# Patient Record
Sex: FEMALE | Race: WHITE | NOT HISPANIC OR LATINO | Employment: FULL TIME | URBAN - METROPOLITAN AREA
[De-identification: names, ages, dates, MRNs, and addresses within clinical notes are randomized per-mention and may not be internally consistent; named-entity substitution may affect disease eponyms.]

---

## 2017-02-17 ENCOUNTER — TRANSFERRED RECORDS (OUTPATIENT)
Dept: HEALTH INFORMATION MANAGEMENT | Facility: CLINIC | Age: 27
End: 2017-02-17

## 2017-02-17 LAB — PAP-ABSTRACT: NORMAL

## 2017-08-07 ENCOUNTER — OFFICE VISIT (OUTPATIENT)
Dept: FAMILY MEDICINE | Facility: CLINIC | Age: 27
End: 2017-08-07
Payer: COMMERCIAL

## 2017-08-07 ENCOUNTER — RADIANT APPOINTMENT (OUTPATIENT)
Dept: GENERAL RADIOLOGY | Facility: CLINIC | Age: 27
End: 2017-08-07
Attending: FAMILY MEDICINE
Payer: COMMERCIAL

## 2017-08-07 VITALS
OXYGEN SATURATION: 99 % | WEIGHT: 130 LBS | HEART RATE: 80 BPM | RESPIRATION RATE: 16 BRPM | HEIGHT: 63 IN | TEMPERATURE: 98.9 F | DIASTOLIC BLOOD PRESSURE: 60 MMHG | SYSTOLIC BLOOD PRESSURE: 100 MMHG | BODY MASS INDEX: 23.04 KG/M2

## 2017-08-07 DIAGNOSIS — S99.911A ANKLE INJURY, RIGHT, INITIAL ENCOUNTER: ICD-10-CM

## 2017-08-07 DIAGNOSIS — S99.911A ANKLE INJURY, RIGHT, INITIAL ENCOUNTER: Primary | ICD-10-CM

## 2017-08-07 PROBLEM — G47.00 INSOMNIA: Status: ACTIVE | Noted: 2017-02-17

## 2017-08-07 PROCEDURE — 73610 X-RAY EXAM OF ANKLE: CPT | Mod: RT

## 2017-08-07 PROCEDURE — 99202 OFFICE O/P NEW SF 15 MIN: CPT | Performed by: FAMILY MEDICINE

## 2017-08-07 NOTE — MR AVS SNAPSHOT
"              After Visit Summary   2017    Melanie Dugan    MRN: [id number]           Patient Information     Date Of Birth          1990        Visit Information        Provider Department      2017 2:45 PM Wing Mccray MD [facility]        Today's Diagnoses     Ankle injury, right, initial encounter    -  1       Follow-ups after your visit        Follow-up notes from your care team     Return if symptoms worsen or fail to improve.      Who to contact     If you have questions or need follow up information about today's clinic visit or your schedule please contact [facility] directly at [phone].  Normal or non-critical lab and imaging results will be communicated to you by MyChart, letter or phone within 4 business days after the clinic has received the results. If you do not hear from us within 7 days, please contact the clinic through MyChart or phone. If you have a critical or abnormal lab result, we will notify you by phone as soon as possible.  Submit refill requests through [employer] or call your pharmacy and they will forward the refill request to us. Please allow 3 business days for your refill to be completed.          Additional Information About Your Visit        MyChart Information     [employer] lets you send messages to your doctor, view your test results, renew your prescriptions, schedule appointments and more. To sign up, go to [email or web address] . Click on \"Log in\" on the left side of the screen, which will take you to the Welcome page. Then click on \"Sign up Now\" on the right side of the page.     You will be asked to enter the access code listed below, as well as some personal information. Please follow the directions to create your username and password.     Your access code is: [email or web address]  Expires: 2017  3:38 PM     Your access code will  in 90 days. If you need help or a new code, please call your [place] clinic or " "[phone].        Care EveryWhere ID     This is your Care EveryWhere ID. This could be used by other organizations to access your [place] medical records  [id number]        Your Vitals Were     Pulse Temperature Respirations Height Pulse Oximetry BMI (Body Mass Index)    80 98.9  F (37.2  C) (Oral) 16 5' 3.25\" (1.607 m) 99% 22.85 kg/m2       Blood Pressure from Last 3 Encounters:   08/07/17 100/60    Weight from Last 3 Encounters:   08/07/17 130 lb (59 kg)               Primary Care Provider    None Specified       No primary provider on file.        Equal Access to Services     [facility]: Hadii usha Britton, wamarisolda hannah, mary kaalmada lindsey, paras martin . So [facility] [phone].    ATENCIÓN: Si habla español, tiene a potter disposición servicios gratuitos de asistencia lingüística. Llame al [phone].    We comply with applicable federal civil rights laws and [place] laws. We do not discriminate on the basis of race, color, national origin, age, disability sex, sexual orientation or gender identity.            Thank you!     Thank you for choosing [facility]  for your care. Our goal is always to provide you with excellent care. Hearing back from our patients is one way we can continue to improve our services. Please take a few minutes to complete the written survey that you may receive in the mail after your visit with us. Thank you!             Your Updated Medication List - Protect others around you: Learn how to safely use, store and throw away your medicines at www.disposemymeds.org.      Notice  As of 8/7/2017  3:38 PM    You have not been prescribed any medications.      "

## 2017-08-07 NOTE — PROGRESS NOTES
"  SUBJECTIVE:                                                    Melanie Dugan is a 27 year old female who presents to clinic today for the following health issues:      Musculoskeletal problem/pain      Duration: 1 1/2 weeks    Description  Location: right foot/ankle    Intensity:  moderate    Accompanying signs and symptoms: numbness, tingling and swelling    History  Previous similar problem: no   Previous evaluation:  none    Precipitating or alleviating factors:  Trauma or overuse: YES  Aggravating factors include: sitting and standing    Therapies tried and outcome: rest/inactivity and ice      Prior history of related problems: no prior problems with this area in the past.  Work related: no    No current outpatient prescriptions on file.     No current facility-administered medications for this visit.      I have reviewed the patient's medical history; there are no changes to the history as noted in EpicCare.  Social History     Social History     Marital status: Single     Spouse name: N/A     Number of children: N/A     Years of education: N/A     Social History Main Topics     Smoking status: Never Smoker     Smokeless tobacco: Never Used     Alcohol use Yes     Drug use: No     Sexual activity: Yes     Partners: Male     Other Topics Concern     None     Social History Narrative     None       ROS: As per HPI.  Skin: no rash or infection  Neuro: no significant weakness, numbness, tingling.    EXAM  /60 (BP Location: Right arm, Cuff Size: Adult Regular)  Pulse 80  Temp 98.9  F (37.2  C) (Oral)  Resp 16  Ht 5' 3.25\" (1.607 m)  Wt 130 lb (59 kg)  SpO2 99%  BMI 22.85 kg/m2  Normal, well appearing  Cv: normal pulses/circulation  There is swelling and tenderness over the lateral malleolus. No tenderness over the medial aspect of the ankle. The fifth metatarsal is not tender. The ankle joint is intact without excessive opening on stressing.Limited foot/ankle neurological exam reveals normal without " focal findings. DTR's, motor strength and sensation normal.    X-ray ordered and interpreted in the office today. X-ray shows: No sign of fracture, a well calcified abnormality is seen in the mid tibia not associated with the patient's complaint today    ASSESSMENT/PLAN:    ICD-10-CM    1. Ankle injury, right, initial encounter S99.911A XR Ankle Right G/E 3 Views       Patient was given instruction on use of ice, rest and wrap for swelling.  Ankle was wrapped today with an Ace wrap  We'll review report from radiologist regarding tibial abnormality    Call or return to clinic as needed if these symptoms worsen or fail to improve as anticipated.    Wing Mccray, MPH.................... 8/7/2017

## 2017-08-07 NOTE — NURSING NOTE
"Chief Complaint   Patient presents with     Trauma     right foot and ankle injury 1 1/2 weeks     initial /60 (BP Location: Right arm, Cuff Size: Adult Regular)  Pulse 80  Temp 98.9  F (37.2  C) (Oral)  Resp 16  Ht 5' 3.25\" (1.607 m)  Wt 130 lb (59 kg)  SpO2 99%  BMI 22.85 kg/m2 Estimated body mass index is 22.85 kg/(m^2) as calculated from the following:    Height as of this encounter: 5' 3.25\" (1.607 m).    Weight as of this encounter: 130 lb (59 kg).  BP completed using cuff size: regular.   R arm      Health Maintenance that is potentially due pending provider review:  NONE    n/a    Caleb Alarcon ma  "

## 2018-08-21 ENCOUNTER — OFFICE VISIT (OUTPATIENT)
Dept: FAMILY MEDICINE | Facility: CLINIC | Age: 28
End: 2018-08-21
Payer: COMMERCIAL

## 2018-08-21 VITALS
BODY MASS INDEX: 24.36 KG/M2 | HEART RATE: 72 BPM | SYSTOLIC BLOOD PRESSURE: 110 MMHG | OXYGEN SATURATION: 97 % | HEIGHT: 63 IN | DIASTOLIC BLOOD PRESSURE: 70 MMHG | WEIGHT: 137.5 LBS | TEMPERATURE: 97.3 F

## 2018-08-21 DIAGNOSIS — G47.00 INSOMNIA, UNSPECIFIED TYPE: ICD-10-CM

## 2018-08-21 DIAGNOSIS — N92.6 IRREGULAR PERIODS: Primary | ICD-10-CM

## 2018-08-21 DIAGNOSIS — F32.1 MODERATE MAJOR DEPRESSION (H): ICD-10-CM

## 2018-08-21 PROCEDURE — 99214 OFFICE O/P EST MOD 30 MIN: CPT | Performed by: FAMILY MEDICINE

## 2018-08-21 RX ORDER — BUPROPION HYDROCHLORIDE 150 MG/1
150 TABLET ORAL EVERY MORNING
Qty: 90 TABLET | Refills: 1 | Status: SHIPPED | OUTPATIENT
Start: 2018-08-21 | End: 2019-06-12

## 2018-08-21 RX ORDER — DIPHENHYDRAMINE HCL 50 MG
50 CAPSULE ORAL
COMMUNITY
Start: 2017-02-17 | End: 2023-02-02

## 2018-08-21 ASSESSMENT — ANXIETY QUESTIONNAIRES
GAD7 TOTAL SCORE: 7
2. NOT BEING ABLE TO STOP OR CONTROL WORRYING: MORE THAN HALF THE DAYS
3. WORRYING TOO MUCH ABOUT DIFFERENT THINGS: MORE THAN HALF THE DAYS
6. BECOMING EASILY ANNOYED OR IRRITABLE: SEVERAL DAYS
5. BEING SO RESTLESS THAT IT IS HARD TO SIT STILL: NOT AT ALL
IF YOU CHECKED OFF ANY PROBLEMS ON THIS QUESTIONNAIRE, HOW DIFFICULT HAVE THESE PROBLEMS MADE IT FOR YOU TO DO YOUR WORK, TAKE CARE OF THINGS AT HOME, OR GET ALONG WITH OTHER PEOPLE: SOMEWHAT DIFFICULT
1. FEELING NERVOUS, ANXIOUS, OR ON EDGE: SEVERAL DAYS
7. FEELING AFRAID AS IF SOMETHING AWFUL MIGHT HAPPEN: NOT AT ALL

## 2018-08-21 ASSESSMENT — PATIENT HEALTH QUESTIONNAIRE - PHQ9: 5. POOR APPETITE OR OVEREATING: SEVERAL DAYS

## 2018-08-21 NOTE — PROGRESS NOTES
"    SUBJECTIVE:   Melanie Dugan is a 28 year old female who presents to clinic today for the following health issues:    28 year old female here originally for complete physical exam but has some concerns she would prefer be addressed today.  Is concerned she has PCOS - has irregular periods and struggles with weight.  Not , but wants kids one day and worries that she won't be able to.  Can we do testing to see if she does have PCOS or not?  Old doctor told her she might have PCOS but feels she doesn't meet other criteria; no hirsutism, no abdominal weight.    Also, notes depression.  Has done therapy in past but not helpful, wonders what next steps are?    Worried about future.  Not boyfriend, not   Wants kids  Worries about PCOS    Periods out of whack.    Birth control in [place], had weight gain    Gaine 20 lns    Die tand exercise, could be better    Just started keto    Slowly    Working every day.    Workout buddy.    Did 2 therapy sessions, not helpful in past  Has good support system here        Problem list and histories reviewed & adjusted, as indicated.  Additional history: as documented    BP Readings from Last 3 Encounters:   08/21/18 110/70   08/07/17 100/60    Wt Readings from Last 3 Encounters:   08/21/18 137 lb 8 oz (62.4 kg)   08/07/17 130 lb (59 kg)           Reviewed and updated as needed this visit by clinical staff  Tobacco  Allergies  Meds  Problems  Med Hx  Surg Hx  Fam Hx  Soc Hx        Reviewed and updated as needed this visit by Provider  Meds  Problems         ROS:  Constitutional, HEENT, cardiovascular, pulmonary, gi and gu systems are negative, except as otherwise noted.    OBJECTIVE:     /70 (Cuff Size: Adult Regular)  Pulse 72  Temp 97.3  F (36.3  C) (Tympanic)  Ht 5' 3\" (1.6 m)  Wt 137 lb 8 oz (62.4 kg)  LMP 08/05/2018 (Approximate)  SpO2 97%  BMI 24.36 kg/m2  Body mass index is 24.36 kg/(m^2).  GENERAL: healthy, alert and no distress  NECK: no " adenopathy, no asymmetry, masses, or scars and thyroid normal to palpation  RESP: lungs clear to auscultation - no rales, rhonchi or wheezes  CV: regular rate and rhythm, normal S1 S2, no S3 or S4, no murmur, click or rub, no peripheral edema and peripheral pulses strong  ABDOMEN: soft, nontender, no hepatosplenomegaly, no masses and bowel sounds normal  MS: no gross musculoskeletal defects noted, no edema    ASSESSMENT/PLAN:     Melanie was seen today for abnormal bleeding problem.    Diagnoses and all orders for this visit:    Irregular periods  -     Lutropin; Future  -     TSH; Future  -     DHEA sulfate; Future  -     Androstenedione; Future  -     17 OH progesterone; Future  -     Testosterone Free and Total; Future  -     Follicle stimulating hormone; Future  -     US Pelvic Complete w Transvaginal; Future    Moderate major depression (H)  -     buPROPion (WELLBUTRIN XL) 150 MG 24 hr tablet; Take 1 tablet (150 mg) by mouth every morning  -     MENTAL HEALTH REFERRAL  - Adult; Outpatient Treatment; Individual/Couples/Family/Group Therapy/Health Psychology; [facility]: [facility] [phone]; We will contact you to schedule the appointment or please call with any questions    Insomnia, unspecified type      For irregular periods and former dx of PCOS without testing and without multiple characteristics:  --Will do further testing.  --Labs and ultrasound.  --Will let patient know results.      For Insomnia:  --Okay to use benadryl occ for sleep.  --Therapy may be helpful for this as well; referred.    For mood changes:  --Discussed treatment options including SSRIs but patient concerned about weight gain.  --Will start wellbutrin - Discussed risks/benefits/side effects.  --Also therapy, referred.  --Follow up in 30 days for complete physical exam; will see how medication working at this point.    Discussed with patient, all questions answered, in agreement with this plan, will return or seek further  care if not improving or worsening.        Sarah Panchal MD  [facility]

## 2018-08-21 NOTE — PROGRESS NOTES
"   SUBJECTIVE:   CC: Melanie Dugan is an 28 year old woman who presents for preventive health visit.     Physical   Annual:     Getting at least 3 servings of Calcium per day:  Yes    Bi-annual eye exam:  NO    Dental care twice a year:  Yes    Diet:  Regular (no restrictions)    Duration of exercise:  15-30 minutes    Additional concerns today:  YES        {additional problems to add (Optional):735389}    Today's PHQ-2 Score:   PHQ-2 ( 1999 Pfizer) 8/21/2018   Q1: Little interest or pleasure in doing things 0   Q2: Feeling down, depressed or hopeless 1   PHQ-2 Score 1   Q1: Little interest or pleasure in doing things Not at all   Q2: Feeling down, depressed or hopeless Several days   PHQ-2 Score 1       Abuse: Current or Past(Physical, Sexual or Emotional)- No  Do you feel safe in your environment - Yes    Social History   Substance Use Topics     Smoking status: Never Smoker     Smokeless tobacco: Never Used     Alcohol use Yes     Alcohol Use 8/21/2018   If you drink alcohol do you typically have greater than 3 drinks per day OR greater than 7 drinks per week? No   No flowsheet data found.    Reviewed orders with patient.  Reviewed health maintenance and updated orders accordingly - Yes  {Chronicprobdata (Optional):918848}    {Mammo Decision Support (Optional):932586}    Pertinent mammograms are reviewed under the imaging tab.  History of abnormal Pap smear: {PAP HX:287529}     Reviewed and updated as needed this visit by clinical staff         Reviewed and updated as needed this visit by Provider        {HISTORY OPTIONS (Optional):050297}    Review of Systems  {FEMALE ROS (Optional):465122}     OBJECTIVE:   There were no vitals taken for this visit.  Physical Exam  {Exam Choices (Optional):925775}    {Diagnostic Test Results (Optional):258287::\"Diagnostic Test Results:\",\"none \"}    ASSESSMENT/PLAN:   {Diag Picklist:032717}    COUNSELING:  {FEMALE COUNSELING MESSAGES:077921::\"Reviewed preventive health counseling, " "as reflected in patient instructions\"}    BP Readings from Last 1 Encounters:   08/07/17 100/60     Estimated body mass index is 22.85 kg/(m^2) as calculated from the following:    Height as of 8/7/17: 5' 3.25\" (1.607 m).    Weight as of 8/7/17: 130 lb (59 kg).    {BP Counseling- Complete if BP >= 120/80  (Optional):524352}  {Weight Management Plan (ACO) Complete if BMI is abnormal-  Ages 18-64  BMI >24.9.  Age 65+ with BMI <23 or >30 (Optional):808979}     reports that she has never smoked. She has never used smokeless tobacco.  {Tobacco Cessation -- Complete if patient is a smoker (Optional):079730}    Counseling Resources:  ATP IV Guidelines  Pooled Cohorts Equation Calculator  Breast Cancer Risk Calculator  FRAX Risk Assessment  ICSI Preventive Guidelines  Dietary Guidelines for Americans, 2010  USDA's MyPlate  ASA Prophylaxis  Lung CA Screening    Sarah Panchal MD  [facility]  "

## 2018-08-21 NOTE — MR AVS SNAPSHOT
After Visit Summary   8/21/2018    Melanie Dugan    MRN: [id number]           Patient Information     Date Of Birth          1990        Visit Information        Provider Department      8/21/2018 4:40 PM Sarah Panchal MD [facility]        Today's Diagnoses     Irregular periods    -  1    Moderate major depression (H)        Insomnia, unspecified type          Care Instructions    1.           Follow-ups after your visit        Additional Services     MENTAL HEALTH REFERRAL  - Adult; Outpatient Treatment; Individual/Couples/Family/Group Therapy/Health Psychology; G: [facility] [phone]; We will contact you to schedule the appointment or please call with any questions       All scheduling is subject to the client's specific insurance plan & benefits, provider/location availability, and provider clinical specialities.  Please arrive 15 minutes early for your first appointment and bring your completed paperwork.    Please be aware that coverage of these services is subject to the terms and limitations of your health insurance plan.  Call member services at your health plan with any benefit or coverage questions.                            Who to contact     If you have questions or need follow up information about today's clinic visit or your schedule please contact [facility] directly at [phone].  Normal or non-critical lab and imaging results will be communicated to you by MyChart, letter or phone within 4 business days after the clinic has received the results. If you do not hear from us within 7 days, please contact the clinic through MyChart or phone. If you have a critical or abnormal lab result, we will notify you by phone as soon as possible.  Submit refill requests through [employer] or call your pharmacy and they will forward the refill request to us. Please allow 3 business days for your  "refill to be completed.          Additional Information About Your Visit        [employer] Information     [employer] gives you secure access to your electronic health record. If you see a primary care provider, you can also send messages to your care team and make appointments. If you have questions, please call your primary care clinic.  If you do not have a primary care provider, please call [phone] and they will assist you.        Care EveryWhere ID     This is your Care EveryWhere ID. This could be used by other organizations to access your [place] medical records  [id number]        Your Vitals Were     Pulse Temperature Height Last Period Pulse Oximetry BMI (Body Mass Index)    72 97.3  F (36.3  C) (Tympanic) 5' 3\" (1.6 m) 08/05/2018 (Approximate) 97% 24.36 kg/m2       Blood Pressure from Last 3 Encounters:   08/21/18 110/70   08/07/17 100/60    Weight from Last 3 Encounters:   08/21/18 137 lb 8 oz (62.4 kg)   08/07/17 130 lb (59 kg)              We Performed the Following     MENTAL HEALTH REFERRAL  - Adult; Outpatient Treatment; Individual/Couples/Family/Group Therapy/Health Psychology; [facility]: [facility] [phone]; We will contact you to schedule the appointment or please call with any questions          Today's Medication Changes          These changes are accurate as of 8/21/18 11:59 PM.  If you have any questions, ask your nurse or doctor.               Start taking these medicines.        Dose/Directions    buPROPion 150 MG 24 hr tablet   Commonly known as:  WELLBUTRIN XL   Used for:  Moderate major depression (H)   Started by:  Sarah Panchal MD        Dose:  150 mg   Take 1 tablet (150 mg) by mouth every morning   Quantity:  90 tablet   Refills:  1            Where to get your medicines      These medications were sent to [facility] [id number] [place] [address] [address] AT [facility]  [address], [facility] [address]    "  Phone:  [phone]     buPROPion 150 MG 24 hr tablet                Primary Care Provider Office Phone # Fax #    Sarah Panchal [phone] [address] [facility] [address]        Equal Access to Services     [employer] : Kathy gleason hadkentono Soomaali, waaxda luqadaha, qaybta kaalmada adeegyada, paras milagroin hayaan lettynicanor hebertandrew hickman. So [facility] [phone].    ATENCIÓN: Si habla español, tiene a potter disposición servicios gratuitos de asistencia lingüística. Llame al [phone].    We comply with applicable federal civil rights laws and [place] laws. We do not discriminate on the basis of race, color, national origin, age, disability, sex, sexual orientation, or gender identity.            Thank you!     Thank you for choosing [facility]  for your care. Our goal is always to provide you with excellent care. Hearing back from our patients is one way we can continue to improve our services. Please take a few minutes to complete the written survey that you may receive in the mail after your visit with us. Thank you!             Your Updated Medication List - Protect others around you: Learn how to safely use, store and throw away your medicines at www.disposemymeds.org.          This list is accurate as of 8/21/18 11:59 PM.  Always use your most recent med list.                   Brand Name Dispense Instructions for use Diagnosis    buPROPion 150 MG 24 hr tablet    WELLBUTRIN XL    90 tablet    Take 1 tablet (150 mg) by mouth every morning    Moderate major depression (H)       diphenhydrAMINE 50 MG capsule    BENADRYL     Take 50 mg by mouth

## 2018-08-22 DIAGNOSIS — N92.6 IRREGULAR PERIODS: ICD-10-CM

## 2018-08-22 LAB
FSH SERPL-ACNC: 2.8 IU/L
LH SERPL-ACNC: 21.5 IU/L
TSH SERPL DL<=0.005 MIU/L-ACNC: 0.71 MU/L (ref 0.4–4)

## 2018-08-22 PROCEDURE — 83002 ASSAY OF GONADOTROPIN (LH): CPT | Performed by: FAMILY MEDICINE

## 2018-08-22 PROCEDURE — 82157 ASSAY OF ANDROSTENEDIONE: CPT | Mod: 90 | Performed by: FAMILY MEDICINE

## 2018-08-22 PROCEDURE — 84270 ASSAY OF SEX HORMONE GLOBUL: CPT | Performed by: FAMILY MEDICINE

## 2018-08-22 PROCEDURE — 36415 COLL VENOUS BLD VENIPUNCTURE: CPT | Performed by: FAMILY MEDICINE

## 2018-08-22 PROCEDURE — 84403 ASSAY OF TOTAL TESTOSTERONE: CPT | Performed by: FAMILY MEDICINE

## 2018-08-22 PROCEDURE — 99000 SPECIMEN HANDLING OFFICE-LAB: CPT | Performed by: FAMILY MEDICINE

## 2018-08-22 PROCEDURE — 82627 DEHYDROEPIANDROSTERONE: CPT | Performed by: FAMILY MEDICINE

## 2018-08-22 PROCEDURE — 83498 ASY HYDROXYPROGESTERONE 17-D: CPT | Performed by: FAMILY MEDICINE

## 2018-08-22 PROCEDURE — 84443 ASSAY THYROID STIM HORMONE: CPT | Performed by: FAMILY MEDICINE

## 2018-08-22 PROCEDURE — 83001 ASSAY OF GONADOTROPIN (FSH): CPT | Performed by: FAMILY MEDICINE

## 2018-08-22 ASSESSMENT — PATIENT HEALTH QUESTIONNAIRE - PHQ9: SUM OF ALL RESPONSES TO PHQ QUESTIONS 1-9: 9

## 2018-08-22 ASSESSMENT — ANXIETY QUESTIONNAIRES: GAD7 TOTAL SCORE: 7

## 2018-08-23 LAB
DHEA-S SERPL-MCNC: 406 UG/DL (ref 35–430)
SHBG SERPL-SCNC: 59 NMOL/L (ref 30–135)
TESTOST FREE SERPL-MCNC: 0.5 NG/DL (ref 0.08–0.74)
TESTOST SERPL-MCNC: 42 NG/DL (ref 8–60)

## 2018-08-24 LAB — ANDROST SERPL-MCNC: 2 NG/ML (ref 0.26–2.14)

## 2018-08-28 LAB — 17OHP SERPL-MCNC: 131 NG/DL

## 2018-08-31 NOTE — PROGRESS NOTES
Good news, Melanie.  Your results are normal.  Let me know if you have any questions.  Dr. Sarah Panchal MD  [facility]  [phone]

## 2018-09-03 PROBLEM — F32.1 MODERATE MAJOR DEPRESSION (H): Status: ACTIVE | Noted: 2018-09-03

## 2018-10-03 ENCOUNTER — OFFICE VISIT (OUTPATIENT)
Dept: FAMILY MEDICINE | Facility: CLINIC | Age: 28
End: 2018-10-03
Payer: COMMERCIAL

## 2018-10-03 VITALS
OXYGEN SATURATION: 99 % | DIASTOLIC BLOOD PRESSURE: 68 MMHG | SYSTOLIC BLOOD PRESSURE: 126 MMHG | HEIGHT: 63 IN | BODY MASS INDEX: 23.85 KG/M2 | TEMPERATURE: 101.5 F | WEIGHT: 134.6 LBS | HEART RATE: 120 BPM

## 2018-10-03 DIAGNOSIS — N10 ACUTE PYELONEPHRITIS: ICD-10-CM

## 2018-10-03 DIAGNOSIS — R35.0 URINARY FREQUENCY: Primary | ICD-10-CM

## 2018-10-03 DIAGNOSIS — Z11.3 SCREEN FOR STD (SEXUALLY TRANSMITTED DISEASE): ICD-10-CM

## 2018-10-03 LAB
ALBUMIN UR-MCNC: 100 MG/DL
APPEARANCE UR: ABNORMAL
BACTERIA #/AREA URNS HPF: ABNORMAL /HPF
BILIRUB UR QL STRIP: NEGATIVE
COLOR UR AUTO: YELLOW
GLUCOSE UR STRIP-MCNC: NEGATIVE MG/DL
HGB UR QL STRIP: ABNORMAL
KETONES UR STRIP-MCNC: NEGATIVE MG/DL
LEUKOCYTE ESTERASE UR QL STRIP: ABNORMAL
NITRATE UR QL: POSITIVE
NON-SQ EPI CELLS #/AREA URNS LPF: ABNORMAL /LPF
PH UR STRIP: 6.5 PH (ref 5–7)
RBC #/AREA URNS AUTO: ABNORMAL /HPF
SOURCE: ABNORMAL
SP GR UR STRIP: 1.01 (ref 1–1.03)
UROBILINOGEN UR STRIP-ACNC: 0.2 EU/DL (ref 0.2–1)
WBC #/AREA URNS AUTO: ABNORMAL /HPF

## 2018-10-03 PROCEDURE — 81001 URINALYSIS AUTO W/SCOPE: CPT | Performed by: FAMILY MEDICINE

## 2018-10-03 PROCEDURE — 87186 SC STD MICRODIL/AGAR DIL: CPT | Performed by: FAMILY MEDICINE

## 2018-10-03 PROCEDURE — 87491 CHLMYD TRACH DNA AMP PROBE: CPT | Performed by: FAMILY MEDICINE

## 2018-10-03 PROCEDURE — 87088 URINE BACTERIA CULTURE: CPT | Performed by: FAMILY MEDICINE

## 2018-10-03 PROCEDURE — 87086 URINE CULTURE/COLONY COUNT: CPT | Performed by: FAMILY MEDICINE

## 2018-10-03 PROCEDURE — 87591 N.GONORRHOEAE DNA AMP PROB: CPT | Performed by: FAMILY MEDICINE

## 2018-10-03 PROCEDURE — 99213 OFFICE O/P EST LOW 20 MIN: CPT | Performed by: FAMILY MEDICINE

## 2018-10-03 RX ORDER — CIPROFLOXACIN 500 MG/1
500 TABLET, FILM COATED ORAL 2 TIMES DAILY
Qty: 20 TABLET | Refills: 0 | Status: SHIPPED | OUTPATIENT
Start: 2018-10-03 | End: 2019-06-12

## 2018-10-03 NOTE — PROGRESS NOTES
"  SUBJECTIVE:   Melanie Dugan is a 28 year old female who presents to clinic today for the following health issues:      Lower abdominal and back pain x1 week.  Fever x1 day.  Urinary frequency.  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      -------------------------------------    Problem list and histories reviewed & adjusted, as indicated.  Additional history: as documented        Reviewed and updated as needed this visit by clinical staff  Tobacco  Allergies  Meds       Reviewed and updated as needed this visit by Provider         OBJECTIVE:     /68  Pulse 120  Temp 101.5  F (38.6  C) (Tympanic)  Ht 5' 3\" (1.6 m)  Wt 134 lb 9.6 oz (61.1 kg)  LMP 09/03/2018 (Exact Date)  SpO2 99%  Breastfeeding? No  BMI 23.84 kg/m2  Body mass index is 23.84 kg/(m^2).    Diagnostic Test Results:  Results for orders placed or performed in visit on 10/03/18   *UA reflex to Microscopic and Culture ([place] and [facility] (except [place] and [place])   Result Value Ref Range    Color Urine Yellow     Appearance Urine Cloudy     Glucose Urine Negative NEG^Negative mg/dL    Bilirubin Urine Negative NEG^Negative    Ketones Urine Negative NEG^Negative mg/dL    Specific Gravity Urine 1.015 1.003 - 1.035    Blood Urine Moderate (A) NEG^Negative    pH Urine 6.5 5.0 - 7.0 pH    Protein Albumin Urine 100 (A) NEG^Negative mg/dL    Urobilinogen Urine 0.2 0.2 - 1.0 EU/dL    Nitrite Urine Positive (A) NEG^Negative    Leukocyte Esterase Urine Moderate (A) NEG^Negative    Source Midstream Urine    Urine Microscopic   Result Value Ref Range    WBC Urine  (A) OTO5^0 - 5 /HPF    RBC Urine 5-10 (A) OTO2^O - 2 /HPF    Squamous Epithelial /LPF Urine Few FEW^Few /LPF    Bacteria Urine Many (A) NEG^Negative /HPF   Urine Culture Aerobic Bacterial   Result Value Ref Range    Specimen Description Unspecified Urine     Culture Micro >100,000 colonies/mL  Escherichia coli   (A)        " Susceptibility    Escherichia coli - DEONDRE     AMPICILLIN >=32 Resistant ug/mL     AMPICILLIN/SULBACTAM >=32 Resistant ug/mL     CEFAZOLIN* 16 Intermediate ug/mL      * Cefazolin DEONDRE breakpoints are for the treatment of uncomplicated urinary tract infections.  For the treatment of systemic infections, please contact the laboratory for additional testing.     CEFEPIME <=1 Sensitive ug/mL     CEFTAZIDIME <=1 Sensitive ug/mL     CEFTRIAXONE <=1 Sensitive ug/mL     CIPROFLOXACIN <=0.25 Sensitive ug/mL     GENTAMICIN <=1 Sensitive ug/mL     LEVOFLOXACIN <=0.12 Sensitive ug/mL     Piperacillin/Tazo <=4 Sensitive ug/mL     TOBRAMYCIN <=1 Sensitive ug/mL     Trimethoprim/Sulfa <=1/19 Sensitive ug/mL     NITROFURANTOIN <=16.0 Sensitive ug/mL     CEFOXITIN <=4.0 Sensitive ug/mL   NEISSERIA GONORRHOEA PCR   Result Value Ref Range    Specimen Descrip Urine     N Gonorrhea PCR Negative NEG^Negative   CHLAMYDIA TRACHOMATIS PCR   Result Value Ref Range    Specimen Description Urine     Chlamydia Trachomatis PCR Negative NEG^Negative       ASSESSMENT/PLAN:     1. Urinary frequency     - *UA reflex to Microscopic and Culture ([place] [facility] (except [place] and [place])  - Urine Microscopic  - Urine Culture Aerobic Bacterial    2. Acute pyelonephritis     - ciprofloxacin (CIPRO) 500 MG tablet; Take 1 tablet (500 mg) by mouth 2 times daily for 10 days  Dispense: 20 tablet; Refill: 0    3. Screen for STD (sexually transmitted disease)     - NEISSERIA GONORRHOEA PCR  - CHLAMYDIA TRACHOMATIS PCR        Sherine Faye, DO  [facility]

## 2018-10-04 LAB
C TRACH DNA SPEC QL NAA+PROBE: NEGATIVE
N GONORRHOEA DNA SPEC QL NAA+PROBE: NEGATIVE
SPECIMEN SOURCE: NORMAL
SPECIMEN SOURCE: NORMAL

## 2018-10-05 LAB
BACTERIA SPEC CULT: ABNORMAL
SPECIMEN SOURCE: ABNORMAL

## 2019-04-27 ENCOUNTER — HOSPITAL ENCOUNTER (EMERGENCY)
Facility: CLINIC | Age: 29
Discharge: HOME | End: 2019-04-28
Attending: EMERGENCY MEDICINE | Admitting: EMERGENCY MEDICINE
Payer: COMMERCIAL

## 2019-04-27 DIAGNOSIS — N20.1 URETERAL STONE: ICD-10-CM

## 2019-04-27 DIAGNOSIS — R10.9 LEFT FLANK PAIN: ICD-10-CM

## 2019-04-27 LAB
ALBUMIN UR-MCNC: 10 MG/DL
ANION GAP SERPL CALCULATED.3IONS-SCNC: 9 MMOL/L (ref 3–14)
APPEARANCE UR: ABNORMAL
BACTERIA #/AREA URNS HPF: ABNORMAL /HPF
BASOPHILS # BLD AUTO: 0 10E9/L (ref 0–0.2)
BASOPHILS NFR BLD AUTO: 0.2 %
BILIRUB UR QL STRIP: NEGATIVE
BUN SERPL-MCNC: 15 MG/DL (ref 7–30)
CALCIUM SERPL-MCNC: 9 MG/DL (ref 8.5–10.1)
CHLORIDE SERPL-SCNC: 104 MMOL/L (ref 94–109)
CO2 SERPL-SCNC: 27 MMOL/L (ref 20–32)
COLOR UR AUTO: YELLOW
CREAT SERPL-MCNC: 0.67 MG/DL (ref 0.52–1.04)
DIFFERENTIAL METHOD BLD: NORMAL
EOSINOPHIL # BLD AUTO: 0 10E9/L (ref 0–0.7)
EOSINOPHIL NFR BLD AUTO: 0.3 %
ERYTHROCYTE [DISTWIDTH] IN BLOOD BY AUTOMATED COUNT: 12.2 % (ref 10–15)
GFR SERPL CREATININE-BSD FRML MDRD: >90 ML/MIN/{1.73_M2}
GLUCOSE SERPL-MCNC: 80 MG/DL (ref 70–99)
GLUCOSE UR STRIP-MCNC: NEGATIVE MG/DL
HCG UR QL: NEGATIVE
HCT VFR BLD AUTO: 40.4 % (ref 35–47)
HGB BLD-MCNC: 13.5 G/DL (ref 11.7–15.7)
HGB UR QL STRIP: ABNORMAL
IMM GRANULOCYTES # BLD: 0 10E9/L (ref 0–0.4)
IMM GRANULOCYTES NFR BLD: 0.2 %
INTERNAL QC OK POCT: YES
KETONES UR STRIP-MCNC: NEGATIVE MG/DL
LEUKOCYTE ESTERASE UR QL STRIP: NEGATIVE
LYMPHOCYTES # BLD AUTO: 2.2 10E9/L (ref 0.8–5.3)
LYMPHOCYTES NFR BLD AUTO: 24.3 %
MCH RBC QN AUTO: 30.7 PG (ref 26.5–33)
MCHC RBC AUTO-ENTMCNC: 33.4 G/DL (ref 31.5–36.5)
MCV RBC AUTO: 92 FL (ref 78–100)
MONOCYTES # BLD AUTO: 1 10E9/L (ref 0–1.3)
MONOCYTES NFR BLD AUTO: 10.5 %
MUCOUS THREADS #/AREA URNS LPF: PRESENT /LPF
NEUTROPHILS # BLD AUTO: 5.9 10E9/L (ref 1.6–8.3)
NEUTROPHILS NFR BLD AUTO: 64.5 %
NITRATE UR QL: NEGATIVE
NRBC # BLD AUTO: 0 10*3/UL
NRBC BLD AUTO-RTO: 0 /100
PH UR STRIP: 6.5 PH (ref 5–7)
PLATELET # BLD AUTO: 247 10E9/L (ref 150–450)
POTASSIUM SERPL-SCNC: 3.4 MMOL/L (ref 3.4–5.3)
RBC # BLD AUTO: 4.4 10E12/L (ref 3.8–5.2)
RBC #/AREA URNS AUTO: 98 /HPF (ref 0–2)
SODIUM SERPL-SCNC: 140 MMOL/L (ref 133–144)
SOURCE: ABNORMAL
SP GR UR STRIP: 1.02 (ref 1–1.03)
SQUAMOUS #/AREA URNS AUTO: 4 /HPF (ref 0–1)
UROBILINOGEN UR STRIP-MCNC: NORMAL MG/DL (ref 0–2)
WBC # BLD AUTO: 9.1 10E9/L (ref 4–11)
WBC #/AREA URNS AUTO: 8 /HPF (ref 0–5)

## 2019-04-27 PROCEDURE — 96361 HYDRATE IV INFUSION ADD-ON: CPT | Performed by: EMERGENCY MEDICINE

## 2019-04-27 PROCEDURE — 99285 EMERGENCY DEPT VISIT HI MDM: CPT | Mod: 25 | Performed by: EMERGENCY MEDICINE

## 2019-04-27 PROCEDURE — 96374 THER/PROPH/DIAG INJ IV PUSH: CPT | Performed by: EMERGENCY MEDICINE

## 2019-04-27 PROCEDURE — 81001 URINALYSIS AUTO W/SCOPE: CPT | Performed by: EMERGENCY MEDICINE

## 2019-04-27 PROCEDURE — 25000128 H RX IP 250 OP 636: Performed by: EMERGENCY MEDICINE

## 2019-04-27 PROCEDURE — 99285 EMERGENCY DEPT VISIT HI MDM: CPT | Mod: Z6 | Performed by: EMERGENCY MEDICINE

## 2019-04-27 PROCEDURE — 81025 URINE PREGNANCY TEST: CPT | Performed by: EMERGENCY MEDICINE

## 2019-04-27 PROCEDURE — 80048 BASIC METABOLIC PNL TOTAL CA: CPT | Performed by: EMERGENCY MEDICINE

## 2019-04-27 PROCEDURE — 85025 COMPLETE CBC W/AUTO DIFF WBC: CPT | Performed by: EMERGENCY MEDICINE

## 2019-04-27 RX ORDER — SODIUM CHLORIDE 9 MG/ML
1000 INJECTION, SOLUTION INTRAVENOUS CONTINUOUS
Status: DISCONTINUED | OUTPATIENT
Start: 2019-04-27 | End: 2019-04-28 | Stop reason: HOSPADM

## 2019-04-27 RX ORDER — KETOROLAC TROMETHAMINE 30 MG/ML
30 INJECTION, SOLUTION INTRAMUSCULAR; INTRAVENOUS ONCE
Status: COMPLETED | OUTPATIENT
Start: 2019-04-27 | End: 2019-04-27

## 2019-04-27 RX ORDER — IBUPROFEN 400 MG/1
400 TABLET, FILM COATED ORAL EVERY 6 HOURS PRN
COMMUNITY
End: 2019-04-28

## 2019-04-27 RX ADMIN — KETOROLAC TROMETHAMINE 30 MG: 30 INJECTION, SOLUTION INTRAMUSCULAR at 22:54

## 2019-04-27 RX ADMIN — SODIUM CHLORIDE 1000 ML: 9 INJECTION, SOLUTION INTRAVENOUS at 22:54

## 2019-04-27 ASSESSMENT — ENCOUNTER SYMPTOMS
NAUSEA: 0
DIFFICULTY URINATING: 0
VOMITING: 0
FREQUENCY: 0
ABDOMINAL PAIN: 1
FEVER: 0
BACK PAIN: 1
APPETITE CHANGE: 0

## 2019-04-27 NOTE — ED AVS SNAPSHOT
[facility], [place], Emergency Department  [address]  [place] [address]  Phone:  [phone]  Fax:  [phone]                                    Melanie Dugan   MRN: [id number]    Department:  [facility], Emergency Department   Date of Visit:  4/27/2019           After Visit Summary Signature Page    I have received my discharge instructions, and my questions have been answered. I have discussed any challenges I see with this plan with the nurse or doctor.    ..........................................................................................................................................  Patient/Patient Representative Signature      ..........................................................................................................................................  Patient Representative Print Name and Relationship to Patient    ..................................................               ................................................  Date                                   Time    ..........................................................................................................................................  Reviewed by Signature/Title    ...................................................              ..............................................  Date                                               Time          22EPIC Rev 08/18

## 2019-04-28 ENCOUNTER — APPOINTMENT (OUTPATIENT)
Dept: CT IMAGING | Facility: CLINIC | Age: 29
End: 2019-04-28
Attending: EMERGENCY MEDICINE
Payer: COMMERCIAL

## 2019-04-28 VITALS
DIASTOLIC BLOOD PRESSURE: 81 MMHG | SYSTOLIC BLOOD PRESSURE: 125 MMHG | RESPIRATION RATE: 16 BRPM | BODY MASS INDEX: 24.45 KG/M2 | WEIGHT: 138 LBS | OXYGEN SATURATION: 99 % | HEART RATE: 90 BPM | TEMPERATURE: 98.4 F

## 2019-04-28 PROCEDURE — 96375 TX/PRO/DX INJ NEW DRUG ADDON: CPT | Performed by: EMERGENCY MEDICINE

## 2019-04-28 PROCEDURE — 25000128 H RX IP 250 OP 636: Performed by: EMERGENCY MEDICINE

## 2019-04-28 PROCEDURE — 74176 CT ABD & PELVIS W/O CONTRAST: CPT

## 2019-04-28 RX ORDER — OXYCODONE HYDROCHLORIDE 5 MG/1
5-10 TABLET ORAL EVERY 6 HOURS PRN
Qty: 12 TABLET | Refills: 0 | Status: SHIPPED | OUTPATIENT
Start: 2019-04-28 | End: 2019-06-12

## 2019-04-28 RX ORDER — IBUPROFEN 600 MG/1
600 TABLET, FILM COATED ORAL EVERY 6 HOURS PRN
Qty: 20 TABLET | Refills: 0 | Status: SHIPPED | OUTPATIENT
Start: 2019-04-28 | End: 2019-06-12

## 2019-04-28 RX ORDER — MORPHINE SULFATE 4 MG/ML
4 INJECTION, SOLUTION INTRAMUSCULAR; INTRAVENOUS
Status: COMPLETED | OUTPATIENT
Start: 2019-04-28 | End: 2019-04-28

## 2019-04-28 RX ORDER — TAMSULOSIN HYDROCHLORIDE 0.4 MG/1
CAPSULE ORAL
Qty: 10 CAPSULE | Refills: 0 | Status: SHIPPED | OUTPATIENT
Start: 2019-04-28 | End: 2019-06-12

## 2019-04-28 RX ADMIN — MORPHINE SULFATE 4 MG: 4 INJECTION, SOLUTION INTRAMUSCULAR; INTRAVENOUS at 01:20

## 2019-04-28 NOTE — DISCHARGE INSTRUCTIONS
Return for fever over 100.4, intolerable pain, or any other worsening or concerns. Strain your urine and collect the stone.    Please make an appointment to follow up with Urology Clinic (phone: [phone]) in 2 weeks.

## 2019-04-28 NOTE — ED PROVIDER NOTES
[facility] EMERGENCY DEPARTMENT ([facility])    4/27/19         SIGN OUT NOTE FROM SIDDHARTH ERNANDEZ MD    Patient was accepted at shift change signout with plan to follow-up on her CT.  CT was positive for 3 mm stone at the left UVJ with moderate left hydro.  The patient looks comfortable currently.  I did discuss the findings.  We will give urinary strainer, she is instructed to strain her urine and collect the stone.  She is instructed to follow-up with Urology in 2 weeks, bring the stone if it has passed.  She will be given a few tablets of oxycodone to use for pain, as well as ibuprofen.  She will also be given a prescription for Flomax.  She is encouraged to return if she develops fever greater than 100.4, pain uncontrolled by the medications we are given, or any other worsening or returns.  She verbalized understanding and is agreeable to plan.    This part of the medical record was transcribed by Destin Spencer Medical Scribe.      Siddharth Ernandez MD  04/28/19 0158
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
## 2019-06-12 ENCOUNTER — OFFICE VISIT (OUTPATIENT)
Dept: FAMILY MEDICINE | Facility: CLINIC | Age: 29
End: 2019-06-12
Payer: COMMERCIAL

## 2019-06-12 VITALS
HEIGHT: 63 IN | SYSTOLIC BLOOD PRESSURE: 110 MMHG | HEART RATE: 85 BPM | DIASTOLIC BLOOD PRESSURE: 73 MMHG | TEMPERATURE: 98 F | OXYGEN SATURATION: 100 % | WEIGHT: 130 LBS | BODY MASS INDEX: 23.04 KG/M2

## 2019-06-12 DIAGNOSIS — N76.0 BACTERIAL VAGINOSIS: ICD-10-CM

## 2019-06-12 DIAGNOSIS — Z86.59 HISTORY OF DEPRESSION: ICD-10-CM

## 2019-06-12 DIAGNOSIS — B96.89 BACTERIAL VAGINOSIS: ICD-10-CM

## 2019-06-12 DIAGNOSIS — Z86.19 HISTORY OF CANDIDIASIS: ICD-10-CM

## 2019-06-12 DIAGNOSIS — N30.00 ACUTE CYSTITIS WITHOUT HEMATURIA: Primary | ICD-10-CM

## 2019-06-12 PROBLEM — F32.1 MODERATE MAJOR DEPRESSION (H): Status: RESOLVED | Noted: 2018-09-03 | Resolved: 2019-06-12

## 2019-06-12 LAB
ALBUMIN UR-MCNC: NEGATIVE MG/DL
APPEARANCE UR: ABNORMAL
BILIRUB UR QL STRIP: NEGATIVE
COLOR UR AUTO: YELLOW
GLUCOSE UR STRIP-MCNC: NEGATIVE MG/DL
HGB UR QL STRIP: ABNORMAL
KETONES UR STRIP-MCNC: NEGATIVE MG/DL
LEUKOCYTE ESTERASE UR QL STRIP: ABNORMAL
NITRATE UR QL: NEGATIVE
PH UR STRIP: 6 PH (ref 5–7)
RBC #/AREA URNS AUTO: ABNORMAL /HPF
SOURCE: ABNORMAL
SP GR UR STRIP: 1.02 (ref 1–1.03)
SPECIMEN SOURCE: ABNORMAL
UROBILINOGEN UR STRIP-ACNC: 0.2 EU/DL (ref 0.2–1)
WBC #/AREA URNS AUTO: ABNORMAL /HPF
WET PREP SPEC: ABNORMAL

## 2019-06-12 PROCEDURE — 87086 URINE CULTURE/COLONY COUNT: CPT | Performed by: FAMILY MEDICINE

## 2019-06-12 PROCEDURE — 87210 SMEAR WET MOUNT SALINE/INK: CPT | Performed by: FAMILY MEDICINE

## 2019-06-12 PROCEDURE — 81001 URINALYSIS AUTO W/SCOPE: CPT | Performed by: FAMILY MEDICINE

## 2019-06-12 PROCEDURE — 87088 URINE BACTERIA CULTURE: CPT | Performed by: FAMILY MEDICINE

## 2019-06-12 PROCEDURE — 87186 SC STD MICRODIL/AGAR DIL: CPT | Performed by: FAMILY MEDICINE

## 2019-06-12 PROCEDURE — 99213 OFFICE O/P EST LOW 20 MIN: CPT | Performed by: FAMILY MEDICINE

## 2019-06-12 RX ORDER — METRONIDAZOLE 7.5 MG/G
1 GEL VAGINAL AT BEDTIME
Qty: 35 G | Refills: 0 | Status: SHIPPED | OUTPATIENT
Start: 2019-06-12 | End: 2019-11-12

## 2019-06-12 RX ORDER — NITROFURANTOIN 25; 75 MG/1; MG/1
100 CAPSULE ORAL 2 TIMES DAILY
Qty: 14 CAPSULE | Refills: 0 | Status: SHIPPED | OUTPATIENT
Start: 2019-06-12 | End: 2019-11-12

## 2019-06-12 RX ORDER — CIPROFLOXACIN 500 MG/1
500 TABLET, FILM COATED ORAL 2 TIMES DAILY
Qty: 14 TABLET | Refills: 0 | Status: SHIPPED | OUTPATIENT
Start: 2019-06-12 | End: 2019-06-12

## 2019-06-12 RX ORDER — FLUCONAZOLE 150 MG/1
150 TABLET ORAL ONCE
Qty: 1 TABLET | Refills: 0 | Status: SHIPPED | OUTPATIENT
Start: 2019-06-12 | End: 2019-11-12

## 2019-06-12 ASSESSMENT — PATIENT HEALTH QUESTIONNAIRE - PHQ9: SUM OF ALL RESPONSES TO PHQ QUESTIONS 1-9: 0

## 2019-06-12 ASSESSMENT — MIFFLIN-ST. JEOR: SCORE: 1288.81
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
## 2019-06-12 NOTE — NURSING NOTE
"Chief Complaint   Patient presents with     UTI     x 1 day     /73   Pulse 85   Temp 98  F (36.7  C) (Oral)   Ht 1.6 m (5' 3\")   Wt 59 kg (130 lb)   SpO2 100%   BMI 23.03 kg/m   Estimated body mass index is 23.03 kg/m  as calculated from the following:    Height as of this encounter: 1.6 m (5' 3\").    Weight as of this encounter: 59 kg (130 lb).        Health Maintenance due pending provider review:  PHQ9    PHQ/ACT/ARTEM--Gave pt questionnare    Laura Pompa CMA  "

## 2019-06-12 NOTE — PATIENT INSTRUCTIONS
1. Urinary tract infection   - UA reflex to Microscopic and Culture  - Wet prep  - Urine Microscopic  - Urine Culture Aerobic Bacterial  - ciprofloxacin (CIPRO) 500 MG tablet; Take 1 tablet (500 mg) by mouth 2 times daily for 7 days  Dispense: 14 tablet; Refill: 0        3. Bacterial vaginosis  If no symptoms- no need to treatment  symptoms are odor or itching   - metroNIDAZOLE (METROGEL) 0.75 % vaginal gel; Place 1 applicator (5 g) vaginally At Bedtime for 7 days  Dispense: 35 g; Refill: 0    4. History of candidiasis  - fluconazole (DIFLUCAN) 150 MG tablet; Take 1 tablet (150 mg) by mouth once for 1 dose  Dispense: 1 tablet; Refill: 0    5. History of depression

## 2019-06-14 LAB
BACTERIA SPEC CULT: ABNORMAL
SPECIMEN SOURCE: ABNORMAL

## 2019-06-14 NOTE — RESULT ENCOUNTER NOTE
Hi    Hope you are better-& able to enjoy the weekend.   The urine culture is positive for typical bacteria, E-coli, the sensitivity to antibiotics still pending

## 2019-06-26 ENCOUNTER — TELEPHONE (OUTPATIENT)
Dept: FAMILY MEDICINE | Facility: CLINIC | Age: 29
End: 2019-06-26
Payer: COMMERCIAL

## 2019-06-26 DIAGNOSIS — N39.0 RECURRENT UTI: Primary | ICD-10-CM

## 2019-06-26 LAB
ALBUMIN UR-MCNC: ABNORMAL MG/DL
APPEARANCE UR: CLEAR
BACTERIA #/AREA URNS HPF: ABNORMAL /HPF
BILIRUB UR QL STRIP: NEGATIVE
COLOR UR AUTO: YELLOW
GLUCOSE UR STRIP-MCNC: NEGATIVE MG/DL
HGB UR QL STRIP: ABNORMAL
KETONES UR STRIP-MCNC: NEGATIVE MG/DL
LEUKOCYTE ESTERASE UR QL STRIP: ABNORMAL
NITRATE UR QL: NEGATIVE
NON-SQ EPI CELLS #/AREA URNS LPF: ABNORMAL /LPF
PH UR STRIP: 6.5 PH (ref 5–7)
RBC #/AREA URNS AUTO: ABNORMAL /HPF
SOURCE: ABNORMAL
SP GR UR STRIP: 1.02 (ref 1–1.03)
UROBILINOGEN UR STRIP-ACNC: 0.2 EU/DL (ref 0.2–1)
WBC #/AREA URNS AUTO: >100 /HPF

## 2019-06-26 PROCEDURE — 81001 URINALYSIS AUTO W/SCOPE: CPT | Performed by: FAMILY MEDICINE

## 2019-06-26 PROCEDURE — 87086 URINE CULTURE/COLONY COUNT: CPT | Performed by: FAMILY MEDICINE

## 2019-06-26 PROCEDURE — 87088 URINE BACTERIA CULTURE: CPT | Performed by: FAMILY MEDICINE

## 2019-06-26 RX ORDER — SULFAMETHOXAZOLE/TRIMETHOPRIM 800-160 MG
1 TABLET ORAL 2 TIMES DAILY
Qty: 14 TABLET | Refills: 0 | Status: SHIPPED | OUTPATIENT
Start: 2019-06-26 | End: 2019-11-12

## 2019-06-26 NOTE — TELEPHONE ENCOUNTER
AS,    Pt experiencing some UTI sx, mostly urgency.  Pt requesting to have a lab only appt for UA, Hx of recent UTI, treated with macrobid. Pt reports no missed doses and finished entire course.    Please advise    Laura Pompa CMA

## 2019-06-26 NOTE — TELEPHONE ENCOUNTER
Discussed    1. Recurrent UTI  2nd urinary tract infection this month  Lab- informed us: wbc/rbc positive in rune test- yet to be released.     hydration, & voiding- olga before & after intercourse  Start antibiotic bactrim -160 MG tablet; Take 1 tablet by mouth 2 times daily for 7 days  Dispense: 14 tablet; Refill: 0    - Urine Culture Aerobic Bacterial; Future  - Urine Culture Aerobic Bacterial  - ir recurrent symptoms / advised office.

## 2019-06-28 LAB
BACTERIA SPEC CULT: ABNORMAL
SPECIMEN SOURCE: ABNORMAL

## 2019-11-12 ENCOUNTER — OFFICE VISIT (OUTPATIENT)
Dept: DERMATOLOGY | Facility: CLINIC | Age: 29
End: 2019-11-12
Payer: COMMERCIAL

## 2019-11-12 VITALS — HEART RATE: 82 BPM | OXYGEN SATURATION: 100 % | SYSTOLIC BLOOD PRESSURE: 115 MMHG | DIASTOLIC BLOOD PRESSURE: 74 MMHG

## 2019-11-12 DIAGNOSIS — L70.0 ACNE VULGARIS: Primary | ICD-10-CM

## 2019-11-12 PROCEDURE — 99203 OFFICE O/P NEW LOW 30 MIN: CPT | Performed by: PHYSICIAN ASSISTANT

## 2019-11-12 RX ORDER — DESOGESTREL AND ETHINYL ESTRADIOL 0.15-0.03
KIT ORAL
Qty: 84 TABLET | Refills: 3 | Status: SHIPPED | OUTPATIENT
Start: 2019-11-12 | End: 2020-10-20

## 2019-11-12 RX ORDER — FAMOTIDINE 10 MG
10 TABLET ORAL
COMMUNITY

## 2019-11-12 RX ORDER — DOXYCYCLINE 100 MG/1
CAPSULE ORAL
Qty: 60 CAPSULE | Refills: 2 | Status: SHIPPED | OUTPATIENT
Start: 2019-11-12 | End: 2020-09-29

## 2019-11-12 RX ORDER — TRETINOIN 0.25 MG/G
CREAM TOPICAL
Qty: 45 G | Refills: 11 | Status: SHIPPED | OUTPATIENT
Start: 2019-11-12 | End: 2021-08-27

## 2019-11-12 NOTE — PROGRESS NOTES
HPI:   CC: Melanie Dugan is a 29 year old female who presents for evaluation and treatment of acne. Acne is on the jawline.   Condition has been present for: recently breaking out a lot  Pt complains of pain: Yes     Previous treatments include: OCPs in the past   Areas Involved: face, neck and sometimes back. Mostly on jaw line.  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 It was created on her behalf by Sandhya Sparks, a trained medical scribe. The creation of this document is based on the provider's statements to the medical scribe.  Sandhya Sparks 4:46 PM November 12, 2019    PHYSICAL EXAM:   A&Ox3: Yes   Well developed/well nourished female Yes   Mood appropriate Yes      /74   Pulse 82   LMP 10/31/2019   SpO2 100%   Breastfeeding? No   Type 2 skin. Mood appropriate  Alert and Oriented X 3. Well developed, well nourished in no distress.  General appearance: Normal  Head including face: Normal  Eyes: conjunctiva and lids: Normal  Mouth: Lips, teeth, gums: Normal  Neck: Normal  Back: clear  Cardiovascular: Exam of peripheral vascular system by observation for swelling, varicosities, edema: Normal  Extremities: digits/nails (clubbing): Normal  Right upper extremity: Normal  Left upper extremity: Normal  Right lower extremity: Normal  Left lower  extremity: Normal  Skin: Scalp and body hair: See below     Comedones Papules/Pustules Cysts Staining Scarring   Face/Neck 0 1+ 0 1+ 0   Chest 0 0 0 0 0   Back 0 0 0 0 0     Telangiectasias: No Fixed Erythema: No Exoriations: No   Other Physical Exam Findings:    ASSESSMENT & PLAN:   1. Acne Vulgaris - advised on diagnosis and treatment options. Discussed use of topical medications and antibiotics.  Discussed topicals vs OCPs vs abx. After lengthy discussion, she would like to proceed with OCPs and abx. Does not smoke and denies any blood clots.  --Start doxycycline 100 mg BID x 3 months. Advised to take with food. Discussed risk of GI upset, esophagitis and photosensitivity.   --Start desogen daily. Advised to take at same time every single day. Discussed increased risk of DVT; denies any personal or fhx of coagulopathy; does not smoke. Advised if experiencing any unexplained pain or swelling in legs, CP or SOB to contact clinic immediately.   --Start tretinoin 0.025% cream daily   --Start daily moisturizer        Pt advised on use and risks including photosensitivity, allergic reactions, GI upset, headaches, nausea, erythema, scaling, vertigo, asthralgias, blood clots:Yes    Follow-up: 2 months  CC:   Scribed By: Sandhya Sparks, Medical Scribe    The information in this document, created by the medical scribe for me, accurately reflects the services I personally performed and the decisions made by me. I have reviewed and approved this document for accuracy prior to leaving the patient care area.  November 12, 2019 4:55 PM    Kaleigh Blevins MS, PA-C

## 2019-11-12 NOTE — PATIENT INSTRUCTIONS
Continue washing face with Cetaphil     Apply a pea sized amount of tretinoin cream to entire face    Apply moisturizer over this    Start doxycycline 100 mg BID x 3 months. Take with food an=d full glass of water.     Follow up in 2 months

## 2019-11-12 NOTE — LETTER
11/12/2019         RE: Melanie Dugan  [address]  [place] [address]        Dear Colleague,    Thank you for referring your patient, Melanie Dugan, to the [facility]. Please see a copy of my visit note below.    HPI:   CC: Melanie Dugan is a 29 year old female who presents for evaluation and treatment of acne. Acne is on the jawline.   Condition has been present for: recently breaking out a lot  Pt complains of pain: Yes     Previous treatments include: OCPs in the past   Areas Involved: face, neck and sometimes back. Mostly on jaw line.  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 It was created on her behalf by Sandhya Sparks, a trained medical scribe. The creation of this document is based on the provider's statements to the medical scribe.  Sandhya Sparks 4:46 PM November 12, 2019    PHYSICAL EXAM:   A&Ox3: Yes   Well developed/well nourished female Yes   Mood appropriate Yes      /74   Pulse 82   LMP 10/31/2019   SpO2 100%   Breastfeeding? No   Type 2 skin. Mood appropriate  Alert and Oriented X 3. Well developed, well nourished in no distress.  General appearance: Normal  Head including face: Normal  Eyes: conjunctiva and lids: Normal  Mouth: Lips, teeth, gums: Normal  Neck: Normal  Back:  clear  Cardiovascular: Exam of peripheral vascular system by observation for swelling, varicosities, edema: Normal  Extremities: digits/nails (clubbing): Normal  Right upper extremity: Normal  Left upper extremity: Normal  Right lower extremity: Normal  Left lower extremity: Normal  Skin: Scalp and body hair: See below     Comedones Papules/Pustules Cysts Staining Scarring   Face/Neck 0 1+ 0 1+ 0   Chest 0 0 0 0 0   Back 0 0 0 0 0     Telangiectasias: No Fixed Erythema: No Exoriations: No   Other Physical Exam Findings:    ASSESSMENT & PLAN:   1. Acne Vulgaris - advised on diagnosis and treatment options. Discussed use of topical medications and antibiotics.  Discussed topicals vs OCPs vs abx. After lengthy discussion, she would like to proceed with OCPs and abx. Does not smoke and denies any blood clots.  --Start doxycycline 100 mg BID x 3 months. Advised to take with food. Discussed risk of GI upset, esophagitis and photosensitivity.   --Start desogen daily. Advised to take at same time every single day. Discussed increased risk of DVT; denies any personal or fhx of coagulopathy; does not smoke. Advised if experiencing any unexplained pain or swelling in legs, CP or SOB to contact clinic immediately.   --Start tretinoin 0.025% cream daily   --Start daily moisturizer        Pt advised on use and risks including photosensitivity, allergic reactions, GI upset, headaches, nausea, erythema, scaling, vertigo, asthralgias, blood clots:Yes    Follow-up: 2 months  CC:   Scribed By: Sandhya Sparks, Medical Scribe    The information in this document, created by the medical scribe for me, accurately reflects the services I personally performed and the decisions made by me. I have reviewed and approved this document for accuracy prior to leaving the patient care area.  November 12, 2019 4:55 PM    Kaleigh Blevins MS, PA-C        Again, thank you for allowing me to participate in the care of your patient.         Sincerely,        Kaleigh Lowry PA-C

## 2019-12-09 ENCOUNTER — MYC MEDICAL ADVICE (OUTPATIENT)
Dept: FAMILY MEDICINE | Facility: CLINIC | Age: 29
End: 2019-12-09

## 2019-12-09 DIAGNOSIS — N92.6 IRREGULAR PERIODS: Primary | ICD-10-CM

## 2019-12-16 ENCOUNTER — ANCILLARY PROCEDURE (OUTPATIENT)
Dept: ULTRASOUND IMAGING | Facility: CLINIC | Age: 29
End: 2019-12-16
Attending: FAMILY MEDICINE
Payer: COMMERCIAL

## 2019-12-16 DIAGNOSIS — N92.6 IRREGULAR PERIODS: ICD-10-CM

## 2019-12-16 PROCEDURE — 76830 TRANSVAGINAL US NON-OB: CPT | Performed by: OBSTETRICS & GYNECOLOGY

## 2019-12-16 PROCEDURE — 76856 US EXAM PELVIC COMPLETE: CPT | Performed by: OBSTETRICS & GYNECOLOGY

## 2019-12-30 ENCOUNTER — MYC MEDICAL ADVICE (OUTPATIENT)
Dept: DERMATOLOGY | Facility: CLINIC | Age: 29
End: 2019-12-30

## 2019-12-30 DIAGNOSIS — L70.9 ACNE: Primary | ICD-10-CM

## 2019-12-31 RX ORDER — SULFAMETHOXAZOLE/TRIMETHOPRIM 800-160 MG
TABLET ORAL
Qty: 60 TABLET | Refills: 1 | Status: SHIPPED | OUTPATIENT
Start: 2019-12-31 | End: 2020-09-29

## 2019-12-31 NOTE — TELEPHONE ENCOUNTER
Patient called back.     Informed patient provider recommends a 2 month course of bactrim, a sulfa antibiotic.      Informed patient there is a slightly higher risk of allergic reactions with this medication- if she develops a rash stop taking and notify clinic.    Patient voiced understanding.    JAMES Alanis-BSN-N  [facility]  [phone]

## 2019-12-31 NOTE — TELEPHONE ENCOUNTER
I can offer a different antibiotic for 2 months; would do bactrim which is a sulfa antibiotic. There is a slightly higher risk of allergic reactions with this so if she develops a rash have her stop and contact me. I will pend if she wants to try; ok to send to pharmacy of choice

## 2019-12-31 NOTE — TELEPHONE ENCOUNTER
Called and LM for patient to call back in regards to alternative abx.    JAMES Alanis-BSN-PHN  [facility]  [phone]

## 2020-03-11 ENCOUNTER — HEALTH MAINTENANCE LETTER (OUTPATIENT)
Age: 30
End: 2020-03-11

## 2020-07-31 DIAGNOSIS — M54.9 BACK PAIN: Primary | ICD-10-CM

## 2020-07-31 DIAGNOSIS — R50.9 FEVER AND CHILLS: Primary | ICD-10-CM

## 2020-07-31 DIAGNOSIS — R50.9 FEVER AND CHILLS: ICD-10-CM

## 2020-07-31 DIAGNOSIS — M54.9 BACK PAIN: ICD-10-CM

## 2020-07-31 LAB
ALBUMIN UR-MCNC: NEGATIVE MG/DL
APPEARANCE UR: ABNORMAL
BACTERIA #/AREA URNS HPF: ABNORMAL /HPF
BILIRUB UR QL STRIP: NEGATIVE
COLOR UR AUTO: YELLOW
GLUCOSE UR STRIP-MCNC: NEGATIVE MG/DL
HGB UR QL STRIP: ABNORMAL
KETONES UR STRIP-MCNC: 40 MG/DL
LEUKOCYTE ESTERASE UR QL STRIP: NEGATIVE
NITRATE UR QL: NEGATIVE
NON-SQ EPI CELLS #/AREA URNS LPF: ABNORMAL /LPF
PH UR STRIP: 6 PH (ref 5–7)
RBC #/AREA URNS AUTO: ABNORMAL /HPF
SOURCE: ABNORMAL
SP GR UR STRIP: 1.02 (ref 1–1.03)
UROBILINOGEN UR STRIP-ACNC: 0.2 EU/DL (ref 0.2–1)
WBC #/AREA URNS AUTO: ABNORMAL /HPF

## 2020-07-31 PROCEDURE — U0003 INFECTIOUS AGENT DETECTION BY NUCLEIC ACID (DNA OR RNA); SEVERE ACUTE RESPIRATORY SYNDROME CORONAVIRUS 2 (SARS-COV-2) (CORONAVIRUS DISEASE [COVID-19]), AMPLIFIED PROBE TECHNIQUE, MAKING USE OF HIGH THROUGHPUT TECHNOLOGIES AS DESCRIBED BY CMS-2020-01-R: HCPCS | Performed by: INTERNAL MEDICINE

## 2020-07-31 PROCEDURE — 81001 URINALYSIS AUTO W/SCOPE: CPT | Performed by: INTERNAL MEDICINE

## 2020-08-01 LAB
SARS-COV-2 RNA SPEC QL NAA+PROBE: NOT DETECTED
SPECIMEN SOURCE: NORMAL

## 2020-09-24 ENCOUNTER — E-VISIT (OUTPATIENT)
Dept: FAMILY MEDICINE | Facility: CLINIC | Age: 30
End: 2020-09-24
Payer: COMMERCIAL

## 2020-09-24 DIAGNOSIS — G47.00 INSOMNIA, UNSPECIFIED TYPE: Primary | ICD-10-CM

## 2020-09-24 PROCEDURE — 99207 ZZC NON-BILLABLE SERV PER CHARTING: CPT | Performed by: FAMILY MEDICINE

## 2020-09-24 NOTE — PATIENT INSTRUCTIONS
Thank you for choosing us for your care. I think an in-clinic visit would be best next steps based on your symptoms. Please schedule a clinic appointment; you won t be charged for this e-visit.      You can [facility] an appointment right here [facility], or call [phone]

## 2020-09-29 ENCOUNTER — VIRTUAL VISIT (OUTPATIENT)
Dept: FAMILY MEDICINE | Facility: CLINIC | Age: 30
End: 2020-09-29
Payer: COMMERCIAL

## 2020-09-29 DIAGNOSIS — F51.01 PRIMARY INSOMNIA: Primary | ICD-10-CM

## 2020-09-29 DIAGNOSIS — F41.9 ANXIETY: ICD-10-CM

## 2020-09-29 PROCEDURE — 99214 OFFICE O/P EST MOD 30 MIN: CPT | Mod: TEL | Performed by: FAMILY MEDICINE

## 2020-09-29 RX ORDER — ESCITALOPRAM OXALATE 10 MG/1
10 TABLET ORAL AT BEDTIME
Qty: 30 TABLET | Refills: 4 | Status: SHIPPED | OUTPATIENT
Start: 2020-09-29 | End: 2021-05-12

## 2020-09-29 RX ORDER — ZOLPIDEM TARTRATE 5 MG/1
5 TABLET ORAL
Qty: 10 TABLET | Refills: 0 | Status: SHIPPED | OUTPATIENT
Start: 2020-09-29 | End: 2022-07-29

## 2020-09-29 NOTE — PROGRESS NOTES
"Melanie Dugan is a 30 year old female who is being evaluated via a billable telephone visit.      The patient has been notified of following:     \"This telephone visit will be conducted via a call between you and your physician/provider. We have found that certain health care needs can be provided without the need for a physical exam.  This service lets us provide the care you need with a short phone conversation.  If a prescription is necessary we can send it directly to your pharmacy.  If lab work is needed we can place an order for that and you can then stop by our lab to have the test done at a later time.    Telephone visits are billed at different rates depending on your insurance coverage. During this emergency period, for some insurers they may be billed the same as an in-person visit.  Please reach out to your insurance provider with any questions.    If during the course of the call the physician/provider feels a telephone visit is not appropriate, you will not be charged for this service.\"    Patient has given verbal consent for Telephone visit?  Yes    What phone number would you like to be contacted at? [phone]    How would you like to obtain your AVS? MyChart    Subjective     Melanie Dugan is a 30 year old female who presents via phone visit today for the following health issues:    HPI    Sleep problem/insomnia  Falling asleep OK, mind races  Has been going on for a while and worsening  A few years had been taking sleep Aid       Tries to get \"8 hours\"        Review of Systems   ENDOCRINE: NEGATIVE for temperature intolerance, skin/hair changes  PSYCHIATRIC: POSITIVE foranxiety  ROS otherwise negative       Objective          Vitals:  No vitals were obtained today due to virtual visit.    healthy, alert and no distress  PSYCH: Alert and oriented times 3; coherent speech, normal   rate and volume, able to articulate logical thoughts, able   to abstract reason, no tangential thoughts, no " hallucinations   or delusions  Her affect is normal  RESP: No cough, no audible wheezing, able to talk in full sentences  Remainder of exam unable to be completed due to telephone visits        Assessment/Plan:    Assessment & Plan     Primary insomnia    Discussed sleep hygiene, same bedtime and wake up time, 1 week of ambien to reset sleep cycle    - zolpidem (AMBIEN) 5 MG tablet; Take 1 tablet (5 mg) by mouth nightly as needed for sleep    Anxiety  Start with for improving anxiety, take at night  - escitalopram (LEXAPRO) 10 MG tablet; Take 1 tablet (10 mg) by mouth At Bedtime       No follow-ups on file.    Wing Mccray MD  [facility]    Phone call duration:  12 minutes

## 2020-10-20 DIAGNOSIS — L70.0 ACNE VULGARIS: ICD-10-CM

## 2020-10-20 RX ORDER — DESOGESTREL AND ETHINYL ESTRADIOL 0.15-0.03
KIT ORAL
Qty: 84 TABLET | Refills: 0 | Status: SHIPPED | OUTPATIENT
Start: 2020-10-20 | End: 2021-01-13

## 2020-10-20 NOTE — TELEPHONE ENCOUNTER
Snail mail letter sent:    Dear Ms. ,    Many medications require routine follow-up with your Doctor.      At this time we ask that: You schedule a routine office visit with Kaleigh Blevins PA-C in Dermatology.    Your prescription: Has been refilled for 1 month so you may have time for the above noted follow-up.      Thank you,    [facility]

## 2020-10-20 NOTE — TELEPHONE ENCOUNTER
Requested Prescriptions   Pending Prescriptions Disp Refills     desogestrel-ethinyl estradiol (APRI) 0.15-30 MG-MCG tablet 84 tablet 3     Sig: Take 1 tab PO daily       There is no refill protocol information for this order        JAMES Alanis-BSN-N  [facility]  [phone]

## 2020-10-20 NOTE — LETTER
[facility]  [address] [address]  Phone: [phone]    October 20, 2020      Melanie Dugan                                                                                                                  [address]   [place] [address]            Dear Ms. Dugan,    Many medications require routine follow-up with your Doctor.      At this time we ask that: You schedule a routine office visit with Kaleigh Blevins PA-C in Dermatology.    Your prescription: Has been refilled for 1 month so you may have time for the above noted follow-up.      Thank you,    [facility]

## 2020-10-20 NOTE — TELEPHONE ENCOUNTER
Called and LM for patient to call back.    Annabelle RN-BSN-N  [facility]  [phone]

## 2020-11-17 ENCOUNTER — OFFICE VISIT (OUTPATIENT)
Dept: DERMATOLOGY | Facility: CLINIC | Age: 30
End: 2020-11-17
Payer: COMMERCIAL

## 2020-11-17 VITALS — OXYGEN SATURATION: 100 % | HEART RATE: 69 BPM | DIASTOLIC BLOOD PRESSURE: 66 MMHG | SYSTOLIC BLOOD PRESSURE: 102 MMHG

## 2020-11-17 DIAGNOSIS — L70.0 ACNE VULGARIS: Primary | ICD-10-CM

## 2020-11-17 PROCEDURE — 99213 OFFICE O/P EST LOW 20 MIN: CPT | Performed by: PHYSICIAN ASSISTANT

## 2020-11-17 RX ORDER — SPIRONOLACTONE 100 MG/1
TABLET, FILM COATED ORAL
Qty: 90 TABLET | Refills: 1 | Status: SHIPPED | OUTPATIENT
Start: 2020-11-17 | End: 2021-07-06

## 2020-11-17 RX ORDER — AZITHROMYCIN 500 MG/1
TABLET, FILM COATED ORAL
Qty: 36 TABLET | Refills: 0 | Status: SHIPPED | OUTPATIENT
Start: 2020-11-17 | End: 2021-08-27

## 2020-11-17 NOTE — PROGRESS NOTES
HPI:   CC: Melanie Dugan is a 29 year old female who presents for recheck of acne. Acne is mainly on the jawline. She is currently taking OCPs and using tretinoin daily. She is still making new acne. She tried doxycycline and bactrim, but didn't notice much improvement from either one of these.   Condition has been present for: awhile  Pt complains of pain: Yes     Areas Involved: face, neck and sometimes back. Mostly on jaw line.    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  Current Outpatient Medications   Medication Sig Dispense Refill     azithromycin (ZITHROMAX) 500 MG tablet 1 tab po M, W F 36 tablet 0     desogestrel-ethinyl estradiol (APRI) 0.15-30 MG-MCG tablet Take 1 tab PO daily 84 tablet 0     diphenhydrAMINE (BENADRYL) 50 MG capsule Take 50 mg by mouth       escitalopram (LEXAPRO) 10 MG tablet Take 1 tablet (10 mg) by mouth At Bedtime 30 tablet 4     famotidine (PEPCID) 10 MG tablet Take 10 mg by mouth nightly as needed       spironolactone (ALDACTONE) 100 MG tablet 1 tab po daily 90 tablet 1     tretinoin (RETIN-A) 0.025 % external cream Apply a pea sized amount to entire face daily 45 g 11     zolpidem (AMBIEN) 5 MG tablet Take 1 tablet (5 mg) by mouth nightly as needed for sleep (Patient not taking: Reported on 11/17/2020) 10 tablet 0     Allergies   Allergen Reactions     Penicillin G Rash     Denies any other skin complaints, in general feels well: Yes  Review of symptoms otherwise negative:Yes      PHYSICAL EXAM:   A&Ox3: Yes   Well developed/well nourished female Yes   Mood appropriate Yes      /66   Pulse 69   LMP 10/23/2020   SpO2 100%   Type 2 skin. Mood appropriate  Alert and Oriented X 3. Well developed, well nourished in no distress.  General appearance: Normal  Head including face: Normal  Eyes: conjunctiva and lids: Normal  Mouth: Lips, teeth, gums: Normal  Neck: Normal  Back: clear  Cardiovascular: Exam of peripheral vascular system by observation for swelling, varicosities,  edema: Normal  Extremities: digits/nails (clubbing): Normal  Right upper extremity: Normal  Left upper extremity: Normal  Right lower extremity: Normal  Left lower extremity: Normal  Skin: Scalp and body hair: See below     Comedones Papules/Pustules Cysts Staining Scarring   Face/Neck 1+ 1+ jawline 0 1+ 0   Chest 0 0 0 0 0   Back 0 0 0 0 0     Telangiectasias: No Fixed Erythema: No Exoriations: No   Other Physical Exam Findings:    ASSESSMENT & PLAN:   1. Acne Vulgaris - advised on diagnosis and treatment options. Discussed use of topical medications and antibiotics.   --Start spironolactone 100 mg daily. Advised on potential for hypotension, teratogenetic effects, menstrual irregularities, hyperkalemia and need for Q 6 month K+ checks.  --Start azithromycin M, W F x 3 months then stop  --Continue desogen daily. Advised to take at same time every single day. Discussed increased risk of DVT; denies any personal or fhx of coagulopathy; does not smoke. Advised if experiencing any unexplained pain or swelling in legs, CP or SOB to contact clinic immediately.   --Continue tretinoin 0.025% cream daily       Pt advised on use and risks including photosensitivity, allergic reactions, GI upset, headaches, nausea, erythema, scaling, vertigo, asthralgias, blood clots:Yes    Follow-up: 3-4 months  CC:   Scribed By: Kaleigh Blevins, MS, PA-C

## 2020-11-17 NOTE — LETTER
11/17/2020         RE: Melanie Dugan  [address]  [place] [address]        Dear Colleague,    Thank you for referring your patient, Melanie Dugan, to the [facility]. Please see a copy of my visit note below.    HPI:   CC: Melanie Dugan is a 29 year old female who presents for recheck of acne. Acne is mainly on the jawline. She is currently taking OCPs and using tretinoin daily. She is still making new acne. She tried doxycycline and bactrim, but didn't notice much improvement from either one of these.   Condition has been present for: awhile  Pt complains of pain: Yes     Areas Involved: face, neck and sometimes back. Mostly on jaw line.    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  Current Outpatient Medications   Medication Sig Dispense Refill     azithromycin (ZITHROMAX) 500 MG tablet 1 tab po M, W F 36 tablet 0     desogestrel-ethinyl estradiol (APRI) 0.15-30 MG-MCG tablet Take 1 tab PO daily 84 tablet 0     diphenhydrAMINE (BENADRYL) 50 MG capsule Take 50 mg by mouth       escitalopram (LEXAPRO) 10 MG tablet Take 1 tablet (10 mg) by mouth At Bedtime 30 tablet 4     famotidine (PEPCID) 10 MG tablet Take 10 mg by mouth nightly as needed       spironolactone (ALDACTONE) 100 MG tablet 1 tab po daily 90 tablet 1     tretinoin (RETIN-A) 0.025 % external cream Apply a pea sized amount to entire face daily 45 g 11     zolpidem (AMBIEN) 5 MG tablet Take 1 tablet (5 mg) by mouth nightly as needed for sleep (Patient not taking: Reported on 11/17/2020) 10 tablet 0     Allergies   Allergen Reactions     Penicillin G Rash     Denies any other skin complaints, in general feels well: Yes  Review of symptoms otherwise negative:Yes      PHYSICAL EXAM:   A&Ox3: Yes   Well developed/well nourished female Yes   Mood appropriate Yes      /66   Pulse 69   LMP 10/23/2020   SpO2 100%   Type 2 skin. Mood appropriate  Alert and Oriented X 3. Well developed, well  nourished in no distress.  General appearance: Normal  Head including face: Normal  Eyes: conjunctiva and lids: Normal  Mouth: Lips, teeth, gums: Normal  Neck: Normal  Back: clear  Cardiovascular: Exam of peripheral vascular system by observation for swelling, varicosities, edema: Normal  Extremities: digits/nails (clubbing): Normal  Right upper extremity: Normal  Left upper extremity: Normal  Right lower extremity: Normal  Left lower extremity: Normal  Skin: Scalp and body hair: See below     Comedones Papules/Pustules Cysts Staining Scarring   Face/Neck 1+ 1+ jawline 0 1+ 0   Chest 0 0 0 0 0   Back 0 0 0 0 0     Telangiectasias: No Fixed Erythema: No Exoriations: No   Other Physical Exam Findings:    ASSESSMENT & PLAN:   1. Acne Vulgaris - advised on diagnosis and treatment options. Discussed use of topical medications and antibiotics.   --Start spironolactone 100 mg daily. Advised on potential for hypotension, teratogenetic effects, menstrual irregularities, hyperkalemia and need for Q 6 month K+ checks.  --Start azithromycin M, W F x 3 months then stop  --Continue desogen daily. Advised to take at same time every single day. Discussed increased risk of DVT; denies any personal or fhx of coagulopathy; does not smoke. Advised if experiencing any unexplained pain or swelling in legs, CP or SOB to contact clinic immediately.   --Continue tretinoin 0.025% cream daily       Pt advised on use and risks including photosensitivity, allergic reactions, GI upset, headaches, nausea, erythema, scaling, vertigo, asthralgias, blood clots:Yes    Follow-up: 3-4 months  CC:   Scribed By: Kaleigh Blevins, MS, PAKaitlinC          Again, thank you for allowing me to participate in the care of your patient.        Sincerely,        Kaleigh Blevins PA-C

## 2020-12-22 ENCOUNTER — MYC MEDICAL ADVICE (OUTPATIENT)
Dept: DERMATOLOGY | Facility: CLINIC | Age: 30
End: 2020-12-22

## 2020-12-22 DIAGNOSIS — B37.31 VAGINAL CANDIDIASIS: Primary | ICD-10-CM

## 2020-12-22 RX ORDER — FLUCONAZOLE 150 MG/1
TABLET ORAL
Qty: 2 TABLET | Refills: 5 | Status: SHIPPED | OUTPATIENT
Start: 2020-12-22 | End: 2021-08-27

## 2020-12-22 NOTE — TELEPHONE ENCOUNTER
[employer] message sent:    Hans Farris sent an order for diflucan (with 5 refills) to [facility] pharmacy.    Take 1 tablet to start- if your symptoms are still present after 3 days take another tablet.    Let me know if you have any questions.    Annabelle RN-BSN-N  [facility]  [phone]

## 2020-12-27 ENCOUNTER — HEALTH MAINTENANCE LETTER (OUTPATIENT)
Age: 30
End: 2020-12-27

## 2020-12-31 ENCOUNTER — TELEPHONE (OUTPATIENT)
Dept: FAMILY MEDICINE | Facility: CLINIC | Age: 30
End: 2020-12-31

## 2020-12-31 NOTE — LETTER
[facility]  [address]  [facility] [address] [phone]       February 24, 2021    Melanie Dugan  [address]   [place] [address]    Dear Melanie,    We care about your health and have reviewed your health plan and are making recommendations based on this review, to optimize your health.    You are in particular need of attention regarding:  -Wellness (Physical) Visit     We are recommending that you:  -schedule a PAP SMEAR EXAM which is due.  Please disregard this reminder if you have had this exam elsewhere within the last year.  It would be helpful for us to have a copy of your recent pap smear report in our file so that we can best coordinate your care.    If you are under/uninsured, we recommend you contact the [facility]. They offer pap smears at no charge or on a sliding fee charge. You can schedule with them at [phone]. Please have them send us the results.      In addition, here is a list of due or overdue Health Maintenance reminders.    Health Maintenance Due   Topic Date Due     Preventive Care Visit  1991     Discuss Advance Care Planning  1991     Depression Action Plan  1991     HIV Screening  03/01/2006     Hepatitis C Screening  03/01/2009     Diptheria Tetanus Pertussis (DTAP/TDAP/TD) Vaccine (1 - Tdap) 03/01/2016     PAP Smear  02/17/2020     Flu Vaccine (1) 09/01/2020       To address the above recommendations, we encourage you to contact us at [phone], via Workbooks or by contacting Central Scheduling toll free at [phone] 24 hours a day. They will assist you with finding the most convenient time and location.    Thank you for trusting [facility] and we appreciate the opportunity to serve you.  We look forward to supporting your healthcare needs in the future.    Healthy Regards,    Your [facility] Team

## 2020-12-31 NOTE — TELEPHONE ENCOUNTER
Patient Quality Outreach      Summary:    Patient is due/failing the following:   Cervical Cancer Screening - PAP Needed    Type of outreach:    Phone, left message for patient/parent to call back.    Questions for provider review:    None                                                                                                                                     Sarah Zhao     Chart routed to Care Team.

## 2021-01-13 DIAGNOSIS — L70.0 ACNE VULGARIS: ICD-10-CM

## 2021-01-13 RX ORDER — DESOGESTREL AND ETHINYL ESTRADIOL 0.15-0.03
KIT ORAL
Qty: 84 TABLET | Refills: 0 | Status: SHIPPED | OUTPATIENT
Start: 2021-01-13 | End: 2021-04-20

## 2021-01-13 NOTE — TELEPHONE ENCOUNTER
Requested Prescriptions   Pending Prescriptions Disp Refills     desogestrel-ethinyl estradiol (APRI) 0.15-30 MG-MCG tablet 84 tablet 0     Sig: Take 1 tab PO daily       There is no refill protocol information for this order        Last Written Prescription Date:    Last Fill Quantity: ,  # refills:    Last office visit: 11/17/2020 with prescribing provider:  Kaleigh Alejandro Office Visit:

## 2021-02-24 NOTE — TELEPHONE ENCOUNTER
Patient Quality Outreach 2nd Attempt      Summary:    Type of outreach:    Sent letter.    Next Steps:  Reach out within 90 days via Letter.    Max number of attempts reached: No. Will try again in 90 days if patient still on fail list.    Questions for provider review:    None                                                                                                                    Maura Parra MA  Medical Assistant  [facility]

## 2021-04-20 DIAGNOSIS — L70.0 ACNE VULGARIS: ICD-10-CM

## 2021-04-20 RX ORDER — DESOGESTREL AND ETHINYL ESTRADIOL 0.15-0.03
KIT ORAL
Qty: 84 TABLET | Refills: 1 | Status: SHIPPED | OUTPATIENT
Start: 2021-04-20 | End: 2021-08-27

## 2021-04-20 NOTE — TELEPHONE ENCOUNTER
Patient calling. Requesting refill of oral OCP    Prescription approved per [facility] Refill Protocol.    Iraida CALLN, RN, PHN

## 2021-04-25 ENCOUNTER — HEALTH MAINTENANCE LETTER (OUTPATIENT)
Age: 31
End: 2021-04-25

## 2021-05-12 DIAGNOSIS — F41.9 ANXIETY: ICD-10-CM

## 2021-05-12 RX ORDER — ESCITALOPRAM OXALATE 10 MG/1
TABLET ORAL
Qty: 30 TABLET | Refills: 3 | Status: SHIPPED | OUTPATIENT
Start: 2021-05-12 | End: 2021-08-27

## 2021-05-12 NOTE — TELEPHONE ENCOUNTER
Escitalopram:    Prescription approved per [facility] Refill Protocol.  Karla Rivera RN

## 2021-05-29 ENCOUNTER — RECORDS - HEALTHEAST (OUTPATIENT)
Dept: ADMINISTRATIVE | Facility: CLINIC | Age: 31
End: 2021-05-29

## 2021-06-28 DIAGNOSIS — N76.0 BV (BACTERIAL VAGINOSIS): Primary | ICD-10-CM

## 2021-06-28 DIAGNOSIS — N89.8 VAGINAL ITCHING: Primary | ICD-10-CM

## 2021-06-28 DIAGNOSIS — N89.8 VAGINAL ITCHING: ICD-10-CM

## 2021-06-28 DIAGNOSIS — B96.89 BV (BACTERIAL VAGINOSIS): Primary | ICD-10-CM

## 2021-06-28 LAB
SPECIMEN SOURCE: ABNORMAL
WET PREP SPEC: ABNORMAL

## 2021-06-28 PROCEDURE — 87210 SMEAR WET MOUNT SALINE/INK: CPT | Performed by: INTERNAL MEDICINE

## 2021-06-28 RX ORDER — METRONIDAZOLE 500 MG/1
500 TABLET ORAL 2 TIMES DAILY
Qty: 14 TABLET | Refills: 0 | Status: SHIPPED | OUTPATIENT
Start: 2021-06-28 | End: 2021-07-05

## 2021-06-28 RX ORDER — METRONIDAZOLE 7.5 MG/G
1 GEL VAGINAL DAILY
Qty: 25 G | Refills: 0 | Status: SHIPPED | OUTPATIENT
Start: 2021-06-28 | End: 2021-07-03

## 2021-07-05 DIAGNOSIS — L70.0 ACNE VULGARIS: ICD-10-CM

## 2021-07-05 NOTE — TELEPHONE ENCOUNTER
"Requested Prescriptions   Pending Prescriptions Disp Refills     spironolactone (ALDACTONE) 100 MG tablet 90 tablet 1     Si tab po daily       Diuretics (Including Combos) Protocol Failed - 2021  9:52 AM        Failed - Normal serum creatinine on file in past 12 months     Recent Labs   Lab Test 19  2249   CR 0.67              Failed - Normal serum potassium on file in past 12 months     Recent Labs   Lab Test 19  2249   POTASSIUM 3.4                    Failed - Normal serum sodium on file in past 12 months     Recent Labs   Lab Test 19  2249                 Passed - Blood pressure under 140/90 in past 12 months     BP Readings from Last 3 Encounters:   20 102/66   19 115/74   19 110/73                 Passed - Recent (12 mo) or future (30 days) visit within the authorizing provider's specialty     Patient has had an office visit with the authorizing provider or a provider within the authorizing providers department within the previous 12 mos or has a future within next 30 days. See \"Patient Info\" tab in inbasket, or \"Choose Columns\" in Meds & Orders section of the refill encounter.              Passed - Medication is active on med list        Passed - Patient is age 18 or older        Passed - No active pregancy on record        Passed - No positive pregnancy test in past 12 months           Last Written Prescription Date:  2020  Last Fill Quantity: 90,  # refills: 1   Last office visit: Visit date not found with prescribing provider:  20   Future Office Visit:            "

## 2021-07-06 RX ORDER — SPIRONOLACTONE 100 MG/1
TABLET, FILM COATED ORAL
Qty: 90 TABLET | Refills: 1 | Status: SHIPPED | OUTPATIENT
Start: 2021-07-06 | End: 2021-08-27
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
## 2021-08-27 ENCOUNTER — OFFICE VISIT (OUTPATIENT)
Dept: MIDWIFE SERVICES | Facility: CLINIC | Age: 31
End: 2021-08-27
Payer: COMMERCIAL

## 2021-08-27 VITALS
DIASTOLIC BLOOD PRESSURE: 58 MMHG | WEIGHT: 123 LBS | HEIGHT: 63 IN | BODY MASS INDEX: 21.79 KG/M2 | SYSTOLIC BLOOD PRESSURE: 114 MMHG

## 2021-08-27 DIAGNOSIS — L70.0 ACNE VULGARIS: ICD-10-CM

## 2021-08-27 DIAGNOSIS — F41.9 ANXIETY: ICD-10-CM

## 2021-08-27 DIAGNOSIS — Z01.419 ENCOUNTER FOR GYNECOLOGICAL EXAMINATION WITHOUT ABNORMAL FINDING: Primary | ICD-10-CM

## 2021-08-27 LAB — HBA1C MFR BLD: 5 % (ref 0–5.6)

## 2021-08-27 PROCEDURE — 36415 COLL VENOUS BLD VENIPUNCTURE: CPT | Performed by: NURSE PRACTITIONER

## 2021-08-27 PROCEDURE — 87624 HPV HI-RISK TYP POOLED RSLT: CPT | Performed by: NURSE PRACTITIONER

## 2021-08-27 PROCEDURE — 87591 N.GONORRHOEAE DNA AMP PROB: CPT | Performed by: NURSE PRACTITIONER

## 2021-08-27 PROCEDURE — 84443 ASSAY THYROID STIM HORMONE: CPT | Performed by: NURSE PRACTITIONER

## 2021-08-27 PROCEDURE — G0145 SCR C/V CYTO,THINLAYER,RESCR: HCPCS | Performed by: NURSE PRACTITIONER

## 2021-08-27 PROCEDURE — 87491 CHLMYD TRACH DNA AMP PROBE: CPT | Performed by: NURSE PRACTITIONER

## 2021-08-27 PROCEDURE — 80061 LIPID PANEL: CPT | Performed by: NURSE PRACTITIONER

## 2021-08-27 PROCEDURE — 80048 BASIC METABOLIC PNL TOTAL CA: CPT | Performed by: NURSE PRACTITIONER

## 2021-08-27 PROCEDURE — 83036 HEMOGLOBIN GLYCOSYLATED A1C: CPT | Performed by: NURSE PRACTITIONER

## 2021-08-27 PROCEDURE — 99385 PREV VISIT NEW AGE 18-39: CPT | Performed by: NURSE PRACTITIONER

## 2021-08-27 RX ORDER — ESCITALOPRAM OXALATE 10 MG/1
10 TABLET ORAL AT BEDTIME
Qty: 30 TABLET | Refills: 11 | Status: SHIPPED | OUTPATIENT
Start: 2021-08-27 | End: 2022-09-13

## 2021-08-27 RX ORDER — SPIRONOLACTONE 100 MG/1
TABLET, FILM COATED ORAL
Qty: 90 TABLET | Refills: 3 | Status: SHIPPED | OUTPATIENT
Start: 2021-08-27 | End: 2022-09-02

## 2021-08-27 RX ORDER — VALACYCLOVIR HYDROCHLORIDE 1 G/1
TABLET, FILM COATED ORAL
COMMUNITY
Start: 2021-02-08 | End: 2022-07-29

## 2021-08-27 RX ORDER — DESOGESTREL AND ETHINYL ESTRADIOL 0.15-0.03
KIT ORAL
Qty: 84 TABLET | Refills: 3 | Status: SHIPPED | OUTPATIENT
Start: 2021-08-27 | End: 2022-08-25

## 2021-08-27 ASSESSMENT — MIFFLIN-ST. JEOR: SCORE: 1247.05

## 2021-08-28 LAB
ANION GAP SERPL CALCULATED.3IONS-SCNC: 8 MMOL/L (ref 3–14)
BUN SERPL-MCNC: 12 MG/DL (ref 7–30)
C TRACH DNA SPEC QL NAA+PROBE: NEGATIVE
CALCIUM SERPL-MCNC: 8.5 MG/DL (ref 8.5–10.1)
CHLORIDE BLD-SCNC: 103 MMOL/L (ref 94–109)
CHOLEST SERPL-MCNC: 222 MG/DL
CO2 SERPL-SCNC: 23 MMOL/L (ref 20–32)
CREAT SERPL-MCNC: 0.76 MG/DL (ref 0.52–1.04)
FASTING STATUS PATIENT QL REPORTED: YES
GFR SERPL CREATININE-BSD FRML MDRD: >90 ML/MIN/1.73M2
GLUCOSE BLD-MCNC: 81 MG/DL (ref 70–99)
HDLC SERPL-MCNC: 110 MG/DL
LDLC SERPL CALC-MCNC: 89 MG/DL
N GONORRHOEA DNA SPEC QL NAA+PROBE: NEGATIVE
NONHDLC SERPL-MCNC: 112 MG/DL
POTASSIUM BLD-SCNC: 3.4 MMOL/L (ref 3.4–5.3)
SODIUM SERPL-SCNC: 134 MMOL/L (ref 133–144)
TRIGL SERPL-MCNC: 115 MG/DL
TSH SERPL DL<=0.005 MIU/L-ACNC: 1.5 MU/L (ref 0.4–4)

## 2021-08-31 LAB
BKR LAB AP GYN ADEQUACY: NORMAL
BKR LAB AP GYN INTERPRETATION: NORMAL
BKR LAB AP HPV REFLEX: NORMAL
BKR LAB AP PREVIOUS ABNORMAL: NORMAL
PATH REPORT.COMMENTS IMP SPEC: NORMAL
PATH REPORT.RELEVANT HX SPEC: NORMAL

## 2021-09-02 LAB
HUMAN PAPILLOMA VIRUS 16 DNA: NEGATIVE
HUMAN PAPILLOMA VIRUS 18 DNA: NEGATIVE
HUMAN PAPILLOMA VIRUS FINAL DIAGNOSIS: NORMAL
HUMAN PAPILLOMA VIRUS OTHER HR: NEGATIVE

## 2021-10-09 ENCOUNTER — HEALTH MAINTENANCE LETTER (OUTPATIENT)
Age: 31
End: 2021-10-09

## 2021-10-15 ENCOUNTER — TELEPHONE (OUTPATIENT)
Dept: INTERNAL MEDICINE | Facility: CLINIC | Age: 31
End: 2021-10-15

## 2021-10-15 DIAGNOSIS — N30.01 ACUTE CYSTITIS WITH HEMATURIA: Primary | ICD-10-CM

## 2021-10-15 RX ORDER — NITROFURANTOIN 25; 75 MG/1; MG/1
100 CAPSULE ORAL 2 TIMES DAILY
Qty: 10 CAPSULE | Refills: 0 | Status: SHIPPED | OUTPATIENT
Start: 2021-10-15 | End: 2021-10-20

## 2021-10-19 PROBLEM — F32.9 MAJOR DEPRESSION: Status: RESOLVED | Noted: 2018-09-03 | Resolved: 2019-06-12

## 2022-01-07 ENCOUNTER — TELEPHONE (OUTPATIENT)
Dept: OBGYN | Facility: CLINIC | Age: 32
End: 2022-01-07
Payer: COMMERCIAL

## 2022-01-07 DIAGNOSIS — B00.1 RECURRENT COLD SORES: Primary | ICD-10-CM

## 2022-01-07 RX ORDER — VALACYCLOVIR HYDROCHLORIDE 1 G/1
1000 TABLET, FILM COATED ORAL 2 TIMES DAILY
Qty: 10 TABLET | Refills: 3 | Status: SHIPPED | OUTPATIENT
Start: 2022-01-07 | End: 2022-08-10

## 2022-01-07 NOTE — TELEPHONE ENCOUNTER
Pt seen by SUSY Krishnamurthy CNM August 2021 for annual physical. She refilled rx's for the year but forgot to mention the Valtrex for cold sores. She takes Valtrex 1g BID for 5 days to tx normally.  Midwives never prescribed before to her - pt reported med only.    Asking if sterling would be willing to sign with refills to get her to August.    Routing to providers to advise w pended rx.  Sarah Cheng RN on 1/7/2022 at 1:24 PM

## 2022-04-18 ENCOUNTER — TRANSFERRED RECORDS (OUTPATIENT)
Dept: HEALTH INFORMATION MANAGEMENT | Facility: CLINIC | Age: 32
End: 2022-04-18

## 2022-05-31 ENCOUNTER — LAB REQUISITION (OUTPATIENT)
Dept: LAB | Facility: CLINIC | Age: 32
End: 2022-05-31

## 2022-05-31 PROCEDURE — 86481 TB AG RESPONSE T-CELL SUSP: CPT | Performed by: INTERNAL MEDICINE

## 2022-06-01 LAB
QUANTIFERON MITOGEN: 10 IU/ML
QUANTIFERON NIL TUBE: 0.07 IU/ML
QUANTIFERON TB1 TUBE: 0.05 IU/ML
QUANTIFERON TB2 TUBE: 0.06

## 2022-06-02 LAB
GAMMA INTERFERON BACKGROUND BLD IA-ACNC: 0.07 IU/ML
M TB IFN-G BLD-IMP: NEGATIVE
M TB IFN-G CD4+ BCKGRND COR BLD-ACNC: 9.93 IU/ML
MITOGEN IGNF BCKGRD COR BLD-ACNC: -0.01 IU/ML
MITOGEN IGNF BCKGRD COR BLD-ACNC: -0.02 IU/ML

## 2022-07-11 NOTE — MR AVS SNAPSHOT
"              After Visit Summary   10/3/2018    Melanie Dugan    MRN: [id number]           Patient Information     Date Of Birth          1990        Visit Information        Provider Department      10/3/2018 2:15 PM Shreine Faye, DO [facility]        Today's Diagnoses     Urinary frequency    -  1    Acute pyelonephritis        Screen for STD (sexually transmitted disease)           Follow-ups after your visit        Who to contact     If you have questions or need follow up information about today's clinic visit or your schedule please contact [facility] directly at [phone].  Normal or non-critical lab and imaging results will be communicated to you by [facility], letter or phone within 4 business days after the clinic has received the results. If you do not hear from us within 7 days, please contact the clinic through [employer] or phone. If you have a critical or abnormal lab result, we will notify you by phone as soon as possible.  Submit refill requests through [employer] or call your pharmacy and they will forward the refill request to us. Please allow 3 business days for your refill to be completed.          Additional Information About Your Visit        MyChart Information     [employer] gives you secure access to your electronic health record. If you see a primary care provider, you can also send messages to your care team and make appointments. If you have questions, please call your primary care clinic.  If you do not have a primary care provider, please call [phone] and they will assist you.        Care EveryWhere ID     This is your Care EveryWhere ID. This could be used by other organizations to access your [facility] medical records  [id number]        Your Vitals Were     Pulse Temperature Height Last Period Pulse Oximetry Breastfeeding?    120 101.5  F (38.6  C) (Tympanic) 5' 3\" (1.6 m) 09/03/2018 (Exact Date) 99% No    BMI (Body Mass Index)                   23.84 kg/m2 "            Blood Pressure from Last 3 Encounters:   10/03/18 126/68   08/21/18 110/70   08/07/17 100/60    Weight from Last 3 Encounters:   10/03/18 134 lb 9.6 oz (61.1 kg)   08/21/18 137 lb 8 oz (62.4 kg)   08/07/17 130 lb (59 kg)              We Performed the Following     *UA reflex to Microscopic and Culture ([place] and [facility] (except [place] and [place])     CHLAMYDIA TRACHOMATIS PCR     NEISSERIA GONORRHOEA PCR     Urine Culture Aerobic Bacterial     Urine Microscopic          Today's Medication Changes          These changes are accurate as of 10/3/18 11:59 PM.  If you have any questions, ask your nurse or doctor.               Start taking these medicines.        Dose/Directions    ciprofloxacin 500 MG tablet   Commonly known as:  CIPRO   Used for:  Acute pyelonephritis   Started by:  Sherine Faye DO        Dose:  500 mg   Take 1 tablet (500 mg) by mouth 2 times daily for 10 days   Quantity:  20 tablet   Refills:  0            Where to get your medicines      These medications were sent to [facility] [address] [place] [address] S AT [facility] & [place] [address] [facility] [address], [facility] [address]     Phone:  [phone]     ciprofloxacin 500 MG tablet                Primary Care Provider Office Phone # Fax #    Sarah Panchal [phone] [address] [facility] [address]        Equal Access to Services     LUCÍA [facility] AH: Hadii usha martinezo Sojesus, waaxda luqadaha, qaybta kaalmada adenicanoryada, paras hickman. So [facility] [phone].    ATENCIÓN: Si habla español, tiene a potter disposición servicios gratuitos de asistencia lingüística. Maddie patrick [phone].    We comply with applicable federal civil rights laws and [place] laws. We do not discriminate on the basis of race, color, national origin, age, disability, sex, sexual orientation, or gender identity.            Thank you!     Thank you  for choosing [facility]  for your care. Our goal is always to provide you with excellent care. Hearing back from our patients is one way we can continue to improve our services. Please take a few minutes to complete the written survey that you may receive in the mail after your visit with us. Thank you!             Your Updated Medication List - Protect others around you: Learn how to safely use, store and throw away your medicines at www.disposemymeds.org.          This list is accurate as of 10/3/18 11:59 PM.  Always use your most recent med list.                   Brand Name Dispense Instructions for use Diagnosis    buPROPion 150 MG 24 hr tablet    WELLBUTRIN XL    90 tablet    Take 1 tablet (150 mg) by mouth every morning    Moderate major depression (H)       ciprofloxacin 500 MG tablet    CIPRO    20 tablet    Take 1 tablet (500 mg) by mouth 2 times daily for 10 days    Acute pyelonephritis       diphenhydrAMINE 50 MG capsule    BENADRYL     Take 50 mg by mouth           40.7
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
## 2022-07-29 ENCOUNTER — OFFICE VISIT (OUTPATIENT)
Dept: OBGYN | Facility: CLINIC | Age: 32
End: 2022-07-29
Payer: COMMERCIAL

## 2022-07-29 VITALS — WEIGHT: 124 LBS | SYSTOLIC BLOOD PRESSURE: 104 MMHG | BODY MASS INDEX: 21.97 KG/M2 | DIASTOLIC BLOOD PRESSURE: 62 MMHG

## 2022-07-29 DIAGNOSIS — N89.8 VAGINAL IRRITATION: ICD-10-CM

## 2022-07-29 DIAGNOSIS — R30.0 DYSURIA: ICD-10-CM

## 2022-07-29 DIAGNOSIS — Z11.59 SCREENING FOR VIRAL AND CHLAMYDIAL DISEASES: Primary | ICD-10-CM

## 2022-07-29 DIAGNOSIS — Z11.4 ENCOUNTER FOR SCREENING FOR HIV: ICD-10-CM

## 2022-07-29 DIAGNOSIS — Z11.8 SCREENING FOR VIRAL AND CHLAMYDIAL DISEASES: Primary | ICD-10-CM

## 2022-07-29 LAB
ALBUMIN UR-MCNC: NEGATIVE MG/DL
APPEARANCE UR: CLEAR
BACTERIAL VAGINOSIS SMEAR: ABNORMAL
BILIRUB UR QL STRIP: NEGATIVE
CLUE CELLS: PRESENT
COLOR UR AUTO: YELLOW
GLUCOSE UR STRIP-MCNC: NEGATIVE MG/DL
HGB UR QL STRIP: ABNORMAL
KETONES UR STRIP-MCNC: NEGATIVE MG/DL
LEUKOCYTE ESTERASE UR QL STRIP: ABNORMAL
NITRATE UR QL: NEGATIVE
NUGENT SCORE: 6
PH UR STRIP: 6 [PH] (ref 5–7)
SP GR UR STRIP: >=1.03 (ref 1–1.03)
T PALLIDUM AB SER QL: NONREACTIVE
TRICHOMONAS, WET PREP: ABNORMAL
UROBILINOGEN UR STRIP-ACNC: 0.2 E.U./DL
WBC'S/HIGH POWER FIELD, WET PREP: ABNORMAL
WHITE BLOOD CELLS: ABNORMAL
YEAST, WET PREP: ABNORMAL

## 2022-07-29 PROCEDURE — 86695 HERPES SIMPLEX TYPE 1 TEST: CPT | Performed by: NURSE PRACTITIONER

## 2022-07-29 PROCEDURE — 87109 MYCOPLASMA: CPT | Performed by: NURSE PRACTITIONER

## 2022-07-29 PROCEDURE — 87491 CHLMYD TRACH DNA AMP PROBE: CPT | Performed by: NURSE PRACTITIONER

## 2022-07-29 PROCEDURE — 99213 OFFICE O/P EST LOW 20 MIN: CPT | Performed by: NURSE PRACTITIONER

## 2022-07-29 PROCEDURE — 87106 FUNGI IDENTIFICATION YEAST: CPT | Performed by: NURSE PRACTITIONER

## 2022-07-29 PROCEDURE — 81003 URINALYSIS AUTO W/O SCOPE: CPT | Performed by: NURSE PRACTITIONER

## 2022-07-29 PROCEDURE — 87205 SMEAR GRAM STAIN: CPT | Performed by: NURSE PRACTITIONER

## 2022-07-29 PROCEDURE — 87591 N.GONORRHOEAE DNA AMP PROB: CPT | Performed by: NURSE PRACTITIONER

## 2022-07-29 PROCEDURE — 87086 URINE CULTURE/COLONY COUNT: CPT | Performed by: NURSE PRACTITIONER

## 2022-07-29 PROCEDURE — 87210 SMEAR WET MOUNT SALINE/INK: CPT | Performed by: NURSE PRACTITIONER

## 2022-07-29 PROCEDURE — 86696 HERPES SIMPLEX TYPE 2 TEST: CPT | Performed by: NURSE PRACTITIONER

## 2022-07-29 PROCEDURE — 87102 FUNGUS ISOLATION CULTURE: CPT | Performed by: NURSE PRACTITIONER

## 2022-07-29 PROCEDURE — 36415 COLL VENOUS BLD VENIPUNCTURE: CPT | Performed by: NURSE PRACTITIONER

## 2022-07-29 PROCEDURE — 87389 HIV-1 AG W/HIV-1&-2 AB AG IA: CPT | Performed by: NURSE PRACTITIONER

## 2022-07-29 PROCEDURE — 86780 TREPONEMA PALLIDUM: CPT | Performed by: NURSE PRACTITIONER

## 2022-07-29 RX ORDER — LACTOBACILLUS RHAMNOSUS GG 10B CELL
1 CAPSULE ORAL 2 TIMES DAILY
COMMUNITY
End: 2022-11-02

## 2022-07-29 RX ORDER — CLINDAMYCIN PHOSPHATE 20 MG/G
1 CREAM VAGINAL AT BEDTIME
Qty: 40 G | Refills: 0 | Status: SHIPPED | OUTPATIENT
Start: 2022-07-29 | End: 2022-08-05

## 2022-07-29 RX ORDER — CYANOCOBALAMIN (VITAMIN B-12) 500 MCG
TABLET ORAL
COMMUNITY
End: 2023-02-02

## 2022-07-30 LAB
C TRACH DNA SPEC QL NAA+PROBE: NEGATIVE
HIV 1+2 AB+HIV1 P24 AG SERPL QL IA: NONREACTIVE
N GONORRHOEA DNA SPEC QL NAA+PROBE: NEGATIVE

## 2022-07-31 LAB — BACTERIA UR CULT: NO GROWTH

## 2022-08-01 LAB
BACTERIA SPEC CULT: ABNORMAL
HSV1 IGG SERPL QL IA: 6.37 INDEX
HSV1 IGG SERPL QL IA: ABNORMAL
HSV2 IGG SERPL QL IA: 1.91 INDEX
HSV2 IGG SERPL QL IA: ABNORMAL

## 2022-08-02 RX ORDER — KETOCONAZOLE 200 MG/1
200 TABLET ORAL DAILY
Qty: 7 TABLET | Refills: 0 | Status: SHIPPED | OUTPATIENT
Start: 2022-08-02 | End: 2022-08-30

## 2022-08-05 LAB — BACTERIA SPEC CULT: NORMAL

## 2022-08-25 DIAGNOSIS — L70.0 ACNE VULGARIS: ICD-10-CM

## 2022-08-25 RX ORDER — DESOGESTREL AND ETHINYL ESTRADIOL 0.15-0.03
KIT ORAL
Qty: 84 TABLET | Refills: 0 | Status: SHIPPED | OUTPATIENT
Start: 2022-08-25 | End: 2023-02-02

## 2022-08-25 NOTE — TELEPHONE ENCOUNTER
"Requested Prescriptions   Pending Prescriptions Disp Refills     desogestrel-ethinyl estradiol (APRI) 0.15-30 MG-MCG tablet 84 tablet 3     Sig: Take 1 tab PO daily       Contraceptives Protocol Passed - 8/25/2022  3:43 PM        Passed - Patient is not a current smoker if age is 35 or older        Passed - Recent (12 mo) or future (30 days) visit within the authorizing provider's specialty     Patient has had an office visit with the authorizing provider or a provider within the authorizing providers department within the previous 12 mos or has a future within next 30 days. See \"Patient Info\" tab in inbasket, or \"Choose Columns\" in Meds & Orders section of the refill encounter.              Passed - Medication is active on med list        Passed - No active pregnancy on record        Passed - No positive pregnancy test in past 12 months               Last Written Prescription Date:  8/27/21  Last Fill Quantity: 84,  # refills: 3  Last office visit: 7/29/2022 with prescribing provider:  Sarah Fritz NP for BV    Last annual katy PETE 8/27/21    Medication is being filled for 1 time refill only due to:  Patient needs to be seen because it has been more than one year since last visit.  Sarah Cheng RN on 8/25/2022 at 3:55 PM      "

## 2022-08-30 ENCOUNTER — OFFICE VISIT (OUTPATIENT)
Dept: OBGYN | Facility: CLINIC | Age: 32
End: 2022-08-30
Payer: COMMERCIAL

## 2022-08-30 VITALS
WEIGHT: 127 LBS | HEIGHT: 63 IN | DIASTOLIC BLOOD PRESSURE: 62 MMHG | SYSTOLIC BLOOD PRESSURE: 98 MMHG | BODY MASS INDEX: 22.5 KG/M2

## 2022-08-30 DIAGNOSIS — N89.8 VAGINAL IRRITATION: Primary | ICD-10-CM

## 2022-08-30 LAB
CLUE CELLS: PRESENT
TRICHOMONAS, WET PREP: ABNORMAL
WBC'S/HIGH POWER FIELD, WET PREP: ABNORMAL
YEAST, WET PREP: ABNORMAL

## 2022-08-30 PROCEDURE — 87210 SMEAR WET MOUNT SALINE/INK: CPT | Performed by: NURSE PRACTITIONER

## 2022-08-30 PROCEDURE — 99213 OFFICE O/P EST LOW 20 MIN: CPT | Performed by: NURSE PRACTITIONER

## 2022-08-30 RX ORDER — METRONIDAZOLE 7.5 MG/G
1 GEL VAGINAL AT BEDTIME
Qty: 70 G | Refills: 0 | Status: SHIPPED | OUTPATIENT
Start: 2022-08-30 | End: 2022-09-04

## 2022-08-30 RX ORDER — TRIAMCINOLONE ACETONIDE 1 MG/G
OINTMENT TOPICAL 2 TIMES DAILY
Qty: 30 G | Refills: 0 | Status: SHIPPED | OUTPATIENT
Start: 2022-08-30 | End: 2022-11-02

## 2022-08-30 RX ORDER — FLUCONAZOLE 150 MG/1
150 TABLET ORAL
Qty: 2 TABLET | Refills: 0 | Status: SHIPPED | OUTPATIENT
Start: 2022-08-30 | End: 2022-11-02

## 2022-08-30 RX ORDER — CLINDAMYCIN PHOSPHATE 20 MG/G
1 CREAM VAGINAL AT BEDTIME
Qty: 40 G | Refills: 0 | Status: SHIPPED | OUTPATIENT
Start: 2022-08-30 | End: 2022-08-30
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
## 2022-09-02 DIAGNOSIS — L70.0 ACNE VULGARIS: ICD-10-CM

## 2022-09-02 RX ORDER — SPIRONOLACTONE 100 MG/1
TABLET, FILM COATED ORAL
Qty: 30 TABLET | Refills: 0 | Status: SHIPPED | OUTPATIENT
Start: 2022-09-02 | End: 2023-02-02

## 2022-09-02 NOTE — TELEPHONE ENCOUNTER
"Requested Prescriptions   Pending Prescriptions Disp Refills     spironolactone (ALDACTONE) 100 MG tablet 90 tablet 3     Si tab po daily       Diuretics (Including Combos) Protocol Failed - 2022  3:32 PM        Failed - Normal serum creatinine on file in past 12 months     Recent Labs   Lab Test 21  1202   CR 0.76              Failed - Normal serum potassium on file in past 12 months     Recent Labs   Lab Test 21  1202   POTASSIUM 3.4                    Failed - Normal serum sodium on file in past 12 months     Recent Labs   Lab Test 21  1202                 Passed - Blood pressure under 140/90 in past 12 months     BP Readings from Last 3 Encounters:   22 98/62   22 104/62   21 114/58                 Passed - Recent (12 mo) or future (30 days) visit within the authorizing provider's specialty     Patient has had an office visit with the authorizing provider or a provider within the authorizing providers department within the previous 12 mos or has a future within next 30 days. See \"Patient Info\" tab in inbasket, or \"Choose Columns\" in Meds & Orders section of the refill encounter.              Passed - Medication is active on med list        Passed - Patient is age 18 or older        Passed - No active pregancy on record        Passed - No positive pregnancy test in past 12 months             Last Written Prescription Date:  2021   Last Fill Quantity: 90,  # refills: 3   Last office visit: 2022 with prescribing provider:  Selam Krishnamurthy   Future Office Visit:   Next 5 appointments (look out 90 days)    Sep 23, 2022 11:00 AM  PHYSICAL with CASH Person CNM  [facility] [place] ([facility] ) [address] [id number] [phone]           Medication is being filled for 1 time refill only due to:  Patient needs to be seen because it has been more than one " year since last visit. Appt scheduled.  Kathy Campbell RN on 9/2/2022 at 3:40 PM

## 2022-09-13 DIAGNOSIS — F41.9 ANXIETY: ICD-10-CM

## 2022-09-13 RX ORDER — ESCITALOPRAM OXALATE 10 MG/1
10 TABLET ORAL AT BEDTIME
Qty: 30 TABLET | Refills: 11 | Status: SHIPPED | OUTPATIENT
Start: 2022-09-13 | End: 2023-02-02

## 2022-09-17 ENCOUNTER — HEALTH MAINTENANCE LETTER (OUTPATIENT)
Age: 32
End: 2022-09-17
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
## 2022-11-02 ENCOUNTER — OFFICE VISIT (OUTPATIENT)
Dept: MIDWIFE SERVICES | Facility: CLINIC | Age: 32
End: 2022-11-02
Payer: COMMERCIAL

## 2022-11-02 VITALS
SYSTOLIC BLOOD PRESSURE: 100 MMHG | WEIGHT: 128.6 LBS | BODY MASS INDEX: 22.79 KG/M2 | HEIGHT: 63 IN | DIASTOLIC BLOOD PRESSURE: 68 MMHG

## 2022-11-02 DIAGNOSIS — Z31.69 ENCOUNTER FOR PRECONCEPTION CONSULTATION: ICD-10-CM

## 2022-11-02 DIAGNOSIS — Z01.419 ENCOUNTER FOR GYNECOLOGICAL EXAMINATION WITHOUT ABNORMAL FINDING: Primary | ICD-10-CM

## 2022-11-02 DIAGNOSIS — Z30.09 FAMILY PLANNING COUNSELING: ICD-10-CM

## 2022-11-02 PROBLEM — Z86.59 HISTORY OF DEPRESSION: Status: RESOLVED | Noted: 2019-06-12 | Resolved: 2022-11-02

## 2022-11-02 PROCEDURE — 99395 PREV VISIT EST AGE 18-39: CPT | Performed by: NURSE PRACTITIONER

## 2022-11-02 PROCEDURE — 0124A COVID-19,PF,PFIZER BOOSTER BIVALENT: CPT | Performed by: NURSE PRACTITIONER

## 2022-11-02 PROCEDURE — 91312 COVID-19,PF,PFIZER BOOSTER BIVALENT: CPT | Performed by: NURSE PRACTITIONER

## 2022-11-02 RX ORDER — PRENATAL VIT/IRON FUM/FOLIC AC 27MG-0.8MG
1 TABLET ORAL DAILY
Qty: 90 TABLET | Refills: 3 | Status: SHIPPED | OUTPATIENT
Start: 2022-11-02 | End: 2023-02-02

## 2022-11-02 ASSESSMENT — ANXIETY QUESTIONNAIRES
GAD7 TOTAL SCORE: 2
3. WORRYING TOO MUCH ABOUT DIFFERENT THINGS: SEVERAL DAYS
2. NOT BEING ABLE TO STOP OR CONTROL WORRYING: NOT AT ALL
7. FEELING AFRAID AS IF SOMETHING AWFUL MIGHT HAPPEN: NOT AT ALL
5. BEING SO RESTLESS THAT IT IS HARD TO SIT STILL: NOT AT ALL
GAD7 TOTAL SCORE: 2
6. BECOMING EASILY ANNOYED OR IRRITABLE: SEVERAL DAYS
1. FEELING NERVOUS, ANXIOUS, OR ON EDGE: NOT AT ALL
IF YOU CHECKED OFF ANY PROBLEMS ON THIS QUESTIONNAIRE, HOW DIFFICULT HAVE THESE PROBLEMS MADE IT FOR YOU TO DO YOUR WORK, TAKE CARE OF THINGS AT HOME, OR GET ALONG WITH OTHER PEOPLE: NOT DIFFICULT AT ALL

## 2022-11-02 ASSESSMENT — PATIENT HEALTH QUESTIONNAIRE - PHQ9
SUM OF ALL RESPONSES TO PHQ QUESTIONS 1-9: 0
5. POOR APPETITE OR OVEREATING: NOT AT ALL

## 2022-11-02 NOTE — PATIENT INSTRUCTIONS
Preventive Health Recommendations  Female Ages 21 - 39  Yearly exam:   See your health care provider every year in order to  1. Review health changes.  2. Discuss preventive care.    3. Review your medicines if you your provider has prescribed any.    You do not need a Pap test if your uterus was removed (hysterectomy) and you have not had cancer.  You should be tested each year for STIs (sexually transmitted diseases) if you're at risk.   Talk to your provider about how often to have your cholesterol checked, about every 5 years if normal.  If you are at risk for diabetes, you should have a diabetes test (fasting glucose).  Vitamin D deficiency screening and thyroid disease screening is also recommended every 3-5 years depending on risk factors or more often if symptomatic  PAP Smear:   Until age 30: Get a Pap test every three years (more often if you have had an abnormal result)    After age 30: Talk to your provider about whether you should have a Pap test every 3 years or have a Pap test with HPV screening every 5 years.   Shots: Get a flu shot each year. Get a tetanus shot every 10 years.   Nutrition:   Eat at least 5 servings of fruits and vegetables each day  Eat REAL food, stay away from processed foods.  Eat whole-grain bread, whole-wheat pasta and brown rice instead of white grains and rice.  For bone health:  Eat calcium-rich foods or take calcium pills (500 to 600 mg) twice a day with food (1200 mg per day). Also take vitamin D (2000 IUs) each day.     Lifestyle  Exercise regularly (30 minutes a day, 5 days of the week). This will help you control your weight and prevent disease.  Weight bearing exercise such as weight lifting, walking, running and yoga will be beneficial later in life preventing osteoporosis   Limit alcohol to one drink per day.  No smoking.   Wear sunscreen to prevent skin cancer.  See your dentist every six months to one year for an exam and cleaning depending on their  recommendation  Get an eye exam every two years or more often if you wear glasses or contacts.     Preconception Care    If you are thinking about becoming pregnant in the near future or actively trying to conceive we want to make sure you are as healthy as possible before becoming pregnant. By meeting with your midwife or provider prior to getting pregnant it allows us to address any health needs that can affect pregnancy. Please discuss your personal and family history with your midwife in order for us to identify any risk factors    If you wish to attempt pregnancy stop whichever form of contraception you use. If you have an IUD it can be removed in the office and you can start trying right away. If you take OCPs finish current pack, cycle, and then you can actively start trying    Make sure all the medications you are taking are safe for pregnancy. This is especially important for women with chronic health conditions such as diabetes, seizure disorders, and/or hypertension. If you are unsure if your medications are safe we can discuss them with you, do not abruptly stop taking something if you've been prescribed a medication by a medical provider    Folic acid 400-800 mcg per day by mouth daily, begin this routine 1 to 12 months prior to planned conception, and continue through at least 13 weeks gestation. Some prenatal/multi-vitamins contain enough folic acid     Be up to date on all your vaccines. Avoid pregnancy for 1 month after administration of varicella/ Rubella (MMR) vaccines    We encourage all women to be at healthy BMI (<26) when attempting pregnancy, it is healthier for both you and your baby. If you are overweight you can make a healthy choice by eating better and exercising more. Waiting to get pregnant at a healthy weight is an excellent choice.                                     Eat a healthy, well-balanced diet containing lots of fresh fruit and vegetables (frozen without added sugar is okay),  protein, and healthy carbohydrates such as whole wheat breads and pastas    Exercise regularly. If you are wishing to get pregnant maintain normal exercise routine. If you don't exercise this is a great time for light activity daily such as walking/swimming    Limit caffeine intake to less than 200 mg per day, typically 1-2 cups of regular coffee is about 200 mg.     Avoid cigarettes, alcohol, and recreational drug use while attempting pregnancy. If you are actively trying to conceive but you get your period or a negative pregnancy test it is okay to have alcohol in moderation until you are actively trying again    If you have the potential to toxic chemical exposures in your work place or home please use special precautions    Menstrual Cycles    Knowing your cycle is incredibly important when attempting pregnancy    Your cycle begins day 1 of your period and goes until the 1st day of your next period. Typically women have 28-32 day cycles. If your cycles are shorter or longer it can be a normal variation.     Women typically ovulate in the middle of their cycle    There are many great apps that can help you track you cycle monthly to establish when you are ovulating    You may also start taking your temperature daily with a basal body temp thermometer to help identify when you ovulate    There are also ovulation predictor kits available over the counter at the pharmacy    If you want more information please contact your midwife      It can take up to 1 year for a woman under the age of 35 or 6 months for a woman over the age of 35 to conceive. If it takes longer than this we would like to evaluate you for fertility issues.     Vaginitis (Vaginal Irritation/Infection)    Vaginitis is very common!  The most common vaginal infections are bacterial vaginosis or yeast. These infections are not sexually transmitted but can be incredibly uncomfortable. Seek care from your midwife if signs or symptoms arise.     Normal  vaginal discharge:    Is white, clear, thick or thin (it may change depending on where you are in your cycle)  Does not have a foul odor  The amount of discharge varies    Abnormal discharge/symptoms:     Itching in and around the vagina  Redness, pain or swelling  Discharge that is foamy, greenish, curd like, or bloody  Foul smelling odor  Pain when urinating or having sex  Fever    Causes of vaginal infections:    Good bacteria from the vagina have been destroyed by bad bacteria  Reaction to something in the vagina such as a tampon or scented/perfumed soaps or bubble bath  STI's  Sensitivities to soaps/detergents/dryer sheets, lubricants, etc.  Hormonal changes  Recent use of antibiotics   Infections can also occur after you've had intercourse with a new partner or if you have had frequent intercourse       Here is a list of suggestions that may help prevent/treat vaginal infections and will help maintain a healthy vaginal environment:      1.  Boosting your immune system so you can heal faster    Make sure you are getting adequate sleep  Drink 2-3 quarts of fluids per day, Cranberries or cranberry juice (unsweetened)  Eat more nuts, grains, raw veggies, yogurt, estela, grapefruit  Decrease intake of refined sugar, red meat and alcohol  Echinacea - 3 times a day for chronic problem or every 2 hours for acute symptoms; use as directed on bottle          2.  Changing the vaginal environment to a more acid state     Soak in a warm bath tub with one cup of vinegar or lemon juice. Do not use scented soap, bubble bath, or oils.       3.  Increasing the good healthy bacteria    At each meal drink 1 tsp apple cider vinegar and 1 tsp honey in   cup warm water  Eat garlic daily, capsules or fresh.    Take probiotics 4-8 billion units/day      4.  Preventive measures    Wear cotton underwear, no thongs.  Do not wear tight clothes or pantyhose  Shower soon after working or change out of sweaty clothing   Do not wear underwear  to bed.  The vaginal environment needs to breathe  Never douche or use vaginal , the vagina is self-cleaning!  Use white, unscented toilet paper.  Do not use baby wipes.  Wipe from front to back  Use only unscented tampons and pads, buy organic products if desired  Do not use perfumes/oils/lotions near your vagina or take bubble baths  Use only mild, unscented soaps around your vaginal area   Do not use fabric softeners/dryer sheets  Use gentle, unscented detergent, consider buying non-petroleum based detergents  Use only water based lubricants during sexual contact  Abstain from intercourse during times of infection      If your symptoms do not resolve or if you have questions please call:     [email or web address]   [phone]

## 2022-11-07 ENCOUNTER — OFFICE VISIT (OUTPATIENT)
Dept: FAMILY MEDICINE | Facility: CLINIC | Age: 32
End: 2022-11-07
Payer: COMMERCIAL

## 2022-11-07 VITALS
TEMPERATURE: 97.7 F | BODY MASS INDEX: 22.67 KG/M2 | HEART RATE: 67 BPM | DIASTOLIC BLOOD PRESSURE: 73 MMHG | RESPIRATION RATE: 16 BRPM | WEIGHT: 128 LBS | SYSTOLIC BLOOD PRESSURE: 111 MMHG | OXYGEN SATURATION: 99 %

## 2022-11-07 DIAGNOSIS — N30.00 ACUTE CYSTITIS WITHOUT HEMATURIA: Primary | ICD-10-CM

## 2022-11-07 DIAGNOSIS — R10.84 ABDOMINAL PAIN, GENERALIZED: Primary | ICD-10-CM

## 2022-11-07 LAB
ALBUMIN UR-MCNC: NEGATIVE MG/DL
AMORPH CRY #/AREA URNS HPF: ABNORMAL /HPF
APPEARANCE UR: ABNORMAL
BACTERIA #/AREA URNS HPF: ABNORMAL /HPF
BILIRUB UR QL STRIP: NEGATIVE
COLOR UR AUTO: YELLOW
GLUCOSE UR STRIP-MCNC: NEGATIVE MG/DL
HGB UR QL STRIP: NEGATIVE
KETONES UR STRIP-MCNC: NEGATIVE MG/DL
LEUKOCYTE ESTERASE UR QL STRIP: ABNORMAL
NITRATE UR QL: NEGATIVE
PH UR STRIP: 6.5 [PH] (ref 5–8)
RBC #/AREA URNS AUTO: ABNORMAL /HPF
SP GR UR STRIP: >=1.03 (ref 1–1.03)
SQUAMOUS #/AREA URNS AUTO: ABNORMAL /LPF
UROBILINOGEN UR STRIP-ACNC: 0.2 E.U./DL
WBC #/AREA URNS AUTO: ABNORMAL /HPF

## 2022-11-07 PROCEDURE — 87088 URINE BACTERIA CULTURE: CPT | Performed by: PHYSICIAN ASSISTANT

## 2022-11-07 PROCEDURE — 87086 URINE CULTURE/COLONY COUNT: CPT | Performed by: PHYSICIAN ASSISTANT

## 2022-11-07 PROCEDURE — 81001 URINALYSIS AUTO W/SCOPE: CPT | Performed by: PHYSICIAN ASSISTANT

## 2022-11-07 PROCEDURE — 99213 OFFICE O/P EST LOW 20 MIN: CPT | Performed by: PHYSICIAN ASSISTANT

## 2022-11-07 RX ORDER — NITROFURANTOIN 25; 75 MG/1; MG/1
100 CAPSULE ORAL 2 TIMES DAILY
Qty: 10 CAPSULE | Refills: 0 | Status: SHIPPED | OUTPATIENT
Start: 2022-11-07 | End: 2022-11-12

## 2022-11-07 ASSESSMENT — ENCOUNTER SYMPTOMS
ABDOMINAL PAIN: 0
DYSURIA: 1
FEVER: 0
NAUSEA: 0
VOMITING: 0
CHILLS: 0
HEMATURIA: 0
FLANK PAIN: 0
FREQUENCY: 1

## 2022-11-07 NOTE — PROGRESS NOTES
Patient presents with:  Flank Pain: On/off x1 week, hx of kidney stone        Clinical Decision Making:  Patient experiencing left-sided upper abdominal pain and left flank pain.  No severe pain.  UA is moderately indicative of UTI.  Will treat with Macrobid today.  If urine culture is negative I will recommend getting a CT to rule out kidney stone.  Joint medical decision-making was used to decide on wakeful watching rather than getting CT done today.  Patient is off of work on Wednesday and would be able to do the image that day if need be.  If urine culture is positive we will continue with treatment currently started.  Consider possibility for PUD, but typically this takes longer to develop and over few weeks of ibuprofen use.  No other associated symptoms concerning for upper GI bleeding.      ICD-10-CM    1. Acute cystitis without hematuria  N30.00 nitroFURantoin macrocrystal-monohydrate (MACROBID) 100 MG capsule          Patient Instructions   1. Increase fluid intake  2. Complete antibiotic regimen as prescribed. You will be notified if the treatment plan needs to be changed based on the urine culture results.   3. Follow if you have a fever of 100.4 F (38 C) or higher, no improvement after three days of treatment, trouble urinating because of pain,new or increased discharge from the vagina, rash or joint pain, Increased back or abdominal pain.        HPI:  Melanie Dugan is a 32 year old female who presents today complaining of left-sided abdominal pain on and off for the past 1 week.  Patient reports past medical history of kidney stones.  She does not feel that her abdominal pain or flank pain is severe.  She denies significant irritative voiding symptoms, but she is had UTIs before without irritative voiding symptoms.  She has been taking a lot of ibuprofen due to a dental issue over the past few weeks.  She denies any black or tarry stools.  She has not had any fevers.    History obtained from the  patient.    Problem List:  2022-11: Family planning counseling  2022-11: Encounter for preconception consultation  2019-06: History of depression  2018-09: Moderate major depression (H)  2017-02: Insomnia  2017-02: Irregular periods      Past Medical History:   Diagnosis Date     HSV-1 (herpes simplex virus 1) infection        Social History     Tobacco Use     Smoking status: Never     Smokeless tobacco: Never   Substance Use Topics     Alcohol use: Yes     Comment: social       Review of Systems   Constitutional: Negative for chills and fever.   Gastrointestinal: Negative for abdominal pain, nausea and vomiting.   Genitourinary: Positive for dysuria and frequency. Negative for flank pain, hematuria, vaginal discharge and vaginal pain.       Vitals:    11/07/22 1448   BP: 111/73   BP Location: Right arm   Patient Position: Sitting   Cuff Size: Adult Regular   Pulse: 67   Resp: 16   Temp: 97.7  F (36.5  C)   TempSrc: Oral   SpO2: 99%   Weight: 58.1 kg (128 lb)       Physical Exam  Vitals and nursing note reviewed.   Constitutional:       General: She is not in acute distress.     Appearance: She is not toxic-appearing or diaphoretic.   HENT:      Head: Normocephalic and atraumatic.      Right Ear: External ear normal.      Left Ear: External ear normal.   Eyes:      Conjunctiva/sclera: Conjunctivae normal.   Pulmonary:      Effort: Pulmonary effort is normal. No respiratory distress.   Neurological:      Mental Status: She is alert.   Psychiatric:         Mood and Affect: Mood normal.         Behavior: Behavior normal.         Thought Content: Thought content normal.         Judgment: Judgment normal.         Results:  Results for orders placed or performed in visit on 11/07/22   UA macro with reflex to Microscopic and Culture - Clinc Collect     Status: Abnormal    Specimen: Urine, Clean Catch   Result Value Ref Range    Color Urine Yellow Colorless, Straw, Light Yellow, Yellow    Appearance Urine Slightly Cloudy  (A) Clear    Glucose Urine Negative Negative, 1000 , >=2000 mg/dL    Bilirubin Urine Negative Negative    Ketones Urine Negative Negative, 160  mg/dL    Specific Gravity Urine >=1.030 1.005 - 1.030    Blood Urine Negative Negative    pH Urine 6.5 5.0 - 8.0    Protein Albumin Urine Negative Negative, 300 , >=2000 mg/dL    Urobilinogen Urine 0.2 0.2, 1.0 E.U./dL    Nitrite Urine Negative Negative    Leukocyte Esterase Urine Moderate (A) Negative   Urine Microscopic Exam     Status: Abnormal   Result Value Ref Range    Bacteria Urine Few (A) None Seen /HPF    RBC Urine 2-5 (A) 0-2 /HPF /HPF    WBC Urine 0-5 0-5 /HPF /HPF    Squamous Epithelials Urine Few (A) None Seen /LPF    Amorphous Crystals Urine Moderate (A) None Seen /HPF         At the end of the encounter, I discussed results, diagnosis, medications. Discussed red flags for immediate return to clinic/ER, as well as indications for follow up if no improvement. Patient understood and agreed to plan. Patient was stable for discharge.

## 2022-11-08 ENCOUNTER — TELEPHONE (OUTPATIENT)
Dept: FAMILY MEDICINE | Facility: CLINIC | Age: 32
End: 2022-11-08

## 2022-11-08 DIAGNOSIS — N30.00 ACUTE CYSTITIS WITHOUT HEMATURIA: Primary | ICD-10-CM

## 2022-11-08 RX ORDER — CEFDINIR 300 MG/1
300 CAPSULE ORAL 2 TIMES DAILY
Qty: 20 CAPSULE | Refills: 0 | Status: SHIPPED | OUTPATIENT
Start: 2022-11-08 | End: 2022-11-18

## 2022-11-09 LAB — BACTERIA UR CULT: ABNORMAL

## 2022-11-09 NOTE — TELEPHONE ENCOUNTER
Patient was contacted by phone and will discontinue the Macrobid and use the cefdinir to cover for the identified pathogen..      Questions were answered to patient satisfaction at conclusion of our phone conversation

## 2022-11-27 ENCOUNTER — MYC MEDICAL ADVICE (OUTPATIENT)
Dept: MIDWIFE SERVICES | Facility: CLINIC | Age: 32
End: 2022-11-27

## 2022-11-27 DIAGNOSIS — N76.0 BACTERIAL VAGINOSIS: Primary | ICD-10-CM

## 2022-11-27 DIAGNOSIS — B96.89 BACTERIAL VAGINOSIS: Primary | ICD-10-CM

## 2022-11-28 RX ORDER — METRONIDAZOLE 7.5 MG/G
1 GEL VAGINAL AT BEDTIME
Qty: 25 G | Refills: 0 | Status: SHIPPED | OUTPATIENT
Start: 2022-11-28 | End: 2022-12-03

## 2022-11-28 NOTE — TELEPHONE ENCOUNTER
Routing pt Zondert message to provider to advise.    11/2/22 annual katy Cheng RN on 11/28/2022 at 11:35 AM

## 2022-11-28 NOTE — CONFIDENTIAL NOTE
Mashed jobshart message returned to Melanie. Per pt recurrent BV was discussed at annual exam on 11/2/22. Will send one time rx for Metrogel. Per review of annual exam note, no discussion of plan for BV maintenance therapy. If BV symptoms persist beyond 5 day Metrogel course then she is to make appt for further testing and discuss of maintenance therapy since she has not been tested for BV/vaginal infections since August 2022.     CASH Marie, CNM

## 2023-02-02 ENCOUNTER — OFFICE VISIT (OUTPATIENT)
Dept: MIDWIFE SERVICES | Facility: CLINIC | Age: 33
End: 2023-02-02
Payer: COMMERCIAL

## 2023-02-02 VITALS — SYSTOLIC BLOOD PRESSURE: 108 MMHG | BODY MASS INDEX: 23.49 KG/M2 | DIASTOLIC BLOOD PRESSURE: 64 MMHG | WEIGHT: 132.6 LBS

## 2023-02-02 DIAGNOSIS — N89.8 VAGINAL DRYNESS: ICD-10-CM

## 2023-02-02 DIAGNOSIS — Z30.011 OCP (ORAL CONTRACEPTIVE PILLS) INITIATION: ICD-10-CM

## 2023-02-02 DIAGNOSIS — N89.8 VAGINAL DISCHARGE: Primary | ICD-10-CM

## 2023-02-02 DIAGNOSIS — B00.1 RECURRENT COLD SORES: ICD-10-CM

## 2023-02-02 PROBLEM — G47.00 INSOMNIA: Status: RESOLVED | Noted: 2017-02-17 | Resolved: 2023-02-02

## 2023-02-02 PROBLEM — Z31.69 ENCOUNTER FOR PRECONCEPTION CONSULTATION: Status: RESOLVED | Noted: 2022-11-02 | Resolved: 2023-02-02

## 2023-02-02 PROBLEM — F41.9 ANXIETY: Status: ACTIVE | Noted: 2023-02-02

## 2023-02-02 LAB
BACTERIAL VAGINOSIS SMEAR: ABNORMAL
CLUE CELLS: ABNORMAL
NUGENT SCORE: 3
TRICHOMONAS, WET PREP: ABNORMAL
WBC'S/HIGH POWER FIELD, WET PREP: ABNORMAL
WHITE BLOOD CELLS: NORMAL
YEAST, WET PREP: ABNORMAL

## 2023-02-02 PROCEDURE — 87205 SMEAR GRAM STAIN: CPT | Performed by: NURSE PRACTITIONER

## 2023-02-02 PROCEDURE — 87102 FUNGUS ISOLATION CULTURE: CPT | Performed by: NURSE PRACTITIONER

## 2023-02-02 PROCEDURE — 87210 SMEAR WET MOUNT SALINE/INK: CPT | Performed by: NURSE PRACTITIONER

## 2023-02-02 PROCEDURE — 99214 OFFICE O/P EST MOD 30 MIN: CPT | Performed by: NURSE PRACTITIONER

## 2023-02-02 RX ORDER — DESOGESTREL AND ETHINYL ESTRADIOL 0.15-0.03
1 KIT ORAL DAILY
Qty: 84 TABLET | Refills: 1 | Status: SHIPPED | OUTPATIENT
Start: 2023-02-02 | End: 2023-06-05

## 2023-02-02 RX ORDER — METRONIDAZOLE 7.5 MG/G
GEL VAGINAL
COMMUNITY
Start: 2023-01-25 | End: 2023-02-02

## 2023-02-02 RX ORDER — VALACYCLOVIR HYDROCHLORIDE 500 MG/1
500 TABLET, FILM COATED ORAL DAILY
Qty: 90 TABLET | Refills: 3 | COMMUNITY
Start: 2023-02-02 | End: 2023-11-16
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
## 2023-02-03 ENCOUNTER — MYC MEDICAL ADVICE (OUTPATIENT)
Dept: MIDWIFE SERVICES | Facility: CLINIC | Age: 33
End: 2023-02-03
Payer: COMMERCIAL

## 2023-02-03 DIAGNOSIS — N89.8 VAGINAL DISCHARGE: Primary | ICD-10-CM

## 2023-02-03 RX ORDER — CLINDAMYCIN PHOSPHATE 20 MG/G
1 CREAM VAGINAL AT BEDTIME
Qty: 40 G | Refills: 0 | Status: SHIPPED | OUTPATIENT
Start: 2023-02-03 | End: 2023-02-10

## 2023-02-03 NOTE — RESULT ENCOUNTER NOTE
Informed via mychart, rx sent for clindamycin cream. Also recommend starting probiotic.    CASH Reyes, CNM

## 2023-02-06 LAB — BACTERIA SPEC CULT: NO GROWTH

## 2023-03-10 DIAGNOSIS — L70.0 ACNE VULGARIS: ICD-10-CM

## 2023-03-10 RX ORDER — SPIRONOLACTONE 100 MG/1
TABLET, FILM COATED ORAL
Qty: 30 TABLET | Refills: 0 | OUTPATIENT
Start: 2023-03-10

## 2023-03-16 DIAGNOSIS — L70.0 ACNE VULGARIS: Primary | ICD-10-CM

## 2023-03-17 DIAGNOSIS — Z13.228 SCREENING FOR METABOLIC DISORDER: ICD-10-CM

## 2023-03-17 DIAGNOSIS — L70.0 ACNE VULGARIS: Primary | ICD-10-CM

## 2023-03-17 RX ORDER — SPIRONOLACTONE 100 MG/1
100 TABLET, FILM COATED ORAL DAILY
Qty: 30 TABLET | Refills: 1 | Status: SHIPPED | OUTPATIENT
Start: 2023-03-17 | End: 2023-11-16

## 2023-03-17 RX ORDER — ESCITALOPRAM OXALATE 10 MG/1
10 TABLET ORAL DAILY
COMMUNITY
Start: 2023-03-10 | End: 2023-10-24

## 2023-09-22 ENCOUNTER — MYC MEDICAL ADVICE (OUTPATIENT)
Dept: MIDWIFE SERVICES | Facility: CLINIC | Age: 33
End: 2023-09-22
Payer: COMMERCIAL

## 2023-09-22 DIAGNOSIS — Z30.011 OCP (ORAL CONTRACEPTIVE PILLS) INITIATION: ICD-10-CM

## 2023-09-22 DIAGNOSIS — L70.0 ACNE VULGARIS: ICD-10-CM

## 2023-09-22 RX ORDER — DROSPIRENONE AND ETHINYL ESTRADIOL 0.02-3(28)
1 KIT ORAL DAILY
Qty: 84 TABLET | Refills: 0 | Status: SHIPPED | OUTPATIENT
Start: 2023-09-22 | End: 2023-11-16

## 2023-09-22 NOTE — CONFIDENTIAL NOTE
Routing pt mychart message to provider to advise.    6/5/23 e visit katy PETEM  Due for annual in November    Pended Hafsa rx as sent before for review. Will need to tell pt due for annual in November as well.    Sarah Cheng RN on 9/22/2023 at 10:50 AM

## 2023-10-03 ENCOUNTER — PATIENT OUTREACH (OUTPATIENT)
Dept: CARE COORDINATION | Facility: CLINIC | Age: 33
End: 2023-10-03
Payer: COMMERCIAL

## 2023-10-17 ENCOUNTER — PATIENT OUTREACH (OUTPATIENT)
Dept: CARE COORDINATION | Facility: CLINIC | Age: 33
End: 2023-10-17
Payer: COMMERCIAL

## 2023-10-18 RX ORDER — ESCITALOPRAM OXALATE 10 MG/1
10 TABLET ORAL DAILY
Qty: 90 TABLET | Refills: 0 | OUTPATIENT
Start: 2023-10-18

## 2023-10-18 NOTE — TELEPHONE ENCOUNTER
"Requested Prescriptions   Pending Prescriptions Disp Refills    escitalopram (LEXAPRO) 10 MG tablet 90 tablet 0     Sig: Take 1 tablet (10 mg) by mouth daily       SSRIs Protocol Failed - 10/18/2023  8:20 AM        Failed - Recent (12 mo) or future (30 days) visit within the authorizing provider's specialty     Patient has had an office visit with the authorizing provider or a provider within the authorizing providers department within the previous 12 mos or has a future within next 30 days. See \"Patient Info\" tab in inbasket, or \"Choose Columns\" in Meds & Orders section of the refill encounter.              Passed - Medication is active on med list        Passed - Patient is age 18 or older        Passed - No active pregnancy on record        Passed - No positive pregnancy test in last 12 months           Last Written Prescription Date:  3/10/23  Last Fill Quantity: 0,  # refills: 0                   Last office visit: 8/30/2022 ; last virtual visit: Visit date not found with prescribing provider:  Selam Krishnamurthy   Future Office Visit:      PT REPORTED MEDICATION   Unable to refill per protocol    Pt to contact clinic to discuss.  Hazel Fuentes RN on 10/18/2023 at 12:03 PM        "

## 2023-10-24 ENCOUNTER — MYC MEDICAL ADVICE (OUTPATIENT)
Dept: MIDWIFE SERVICES | Facility: CLINIC | Age: 33
End: 2023-10-24
Payer: COMMERCIAL

## 2023-10-24 DIAGNOSIS — F41.9 ANXIETY: Primary | ICD-10-CM

## 2023-10-24 RX ORDER — ESCITALOPRAM OXALATE 10 MG/1
10 TABLET ORAL DAILY
Qty: 90 TABLET | Refills: 0 | Status: SHIPPED | OUTPATIENT
Start: 2023-10-24 | End: 2023-11-16

## 2023-10-24 NOTE — TELEPHONE ENCOUNTER
Verified with pharmacy that Melanie's Lexapro had been previously prescribed by Selam PETEM   Refill approved    Upcoming appt in November  Hazel Fuentes RN on 10/24/2023 at 10:03 AM
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
## 2023-11-16 ENCOUNTER — OFFICE VISIT (OUTPATIENT)
Dept: MIDWIFE SERVICES | Facility: CLINIC | Age: 33
End: 2023-11-16
Payer: COMMERCIAL

## 2023-11-16 VITALS
BODY MASS INDEX: 23.57 KG/M2 | SYSTOLIC BLOOD PRESSURE: 110 MMHG | WEIGHT: 133 LBS | HEIGHT: 63 IN | DIASTOLIC BLOOD PRESSURE: 62 MMHG

## 2023-11-16 DIAGNOSIS — Z31.69 ENCOUNTER FOR PRECONCEPTION CONSULTATION: ICD-10-CM

## 2023-11-16 DIAGNOSIS — F41.9 ANXIETY: ICD-10-CM

## 2023-11-16 DIAGNOSIS — Z30.011 OCP (ORAL CONTRACEPTIVE PILLS) INITIATION: ICD-10-CM

## 2023-11-16 DIAGNOSIS — Z01.419 ENCOUNTER FOR GYNECOLOGICAL EXAMINATION WITHOUT ABNORMAL FINDING: Primary | ICD-10-CM

## 2023-11-16 DIAGNOSIS — L70.0 ACNE VULGARIS: ICD-10-CM

## 2023-11-16 DIAGNOSIS — B00.1 RECURRENT COLD SORES: ICD-10-CM

## 2023-11-16 PROCEDURE — 99395 PREV VISIT EST AGE 18-39: CPT | Performed by: NURSE PRACTITIONER

## 2023-11-16 RX ORDER — DROSPIRENONE AND ETHINYL ESTRADIOL 0.02-3(28)
1 KIT ORAL DAILY
Qty: 84 TABLET | Refills: 0 | Status: SHIPPED | OUTPATIENT
Start: 2023-11-16 | End: 2024-04-08

## 2023-11-16 RX ORDER — PRENATAL VIT/IRON FUM/FOLIC AC 27MG-0.8MG
1 TABLET ORAL DAILY
Qty: 90 TABLET | Refills: 3 | Status: SHIPPED | OUTPATIENT
Start: 2023-11-16 | End: 2024-11-10

## 2023-11-16 RX ORDER — ESCITALOPRAM OXALATE 10 MG/1
10 TABLET ORAL DAILY
Qty: 90 TABLET | Refills: 1 | Status: SHIPPED | OUTPATIENT
Start: 2023-11-16 | End: 2024-07-18

## 2023-11-16 RX ORDER — ISOTRETINOIN 20 MG/1
20 CAPSULE ORAL DAILY
COMMUNITY
End: 2024-08-02

## 2023-11-16 RX ORDER — ISOTRETINOIN 40 MG/1
40 CAPSULE ORAL DAILY
COMMUNITY
End: 2024-08-02

## 2023-11-16 RX ORDER — VALACYCLOVIR HYDROCHLORIDE 500 MG/1
500 TABLET, FILM COATED ORAL DAILY
Qty: 90 TABLET | Refills: 3 | Status: SHIPPED | OUTPATIENT
Start: 2023-11-16

## 2023-11-16 ASSESSMENT — ANXIETY QUESTIONNAIRES
7. FEELING AFRAID AS IF SOMETHING AWFUL MIGHT HAPPEN: NOT AT ALL
5. BEING SO RESTLESS THAT IT IS HARD TO SIT STILL: NOT AT ALL
6. BECOMING EASILY ANNOYED OR IRRITABLE: SEVERAL DAYS
3. WORRYING TOO MUCH ABOUT DIFFERENT THINGS: SEVERAL DAYS
GAD7 TOTAL SCORE: 4
IF YOU CHECKED OFF ANY PROBLEMS ON THIS QUESTIONNAIRE, HOW DIFFICULT HAVE THESE PROBLEMS MADE IT FOR YOU TO DO YOUR WORK, TAKE CARE OF THINGS AT HOME, OR GET ALONG WITH OTHER PEOPLE: NOT DIFFICULT AT ALL
1. FEELING NERVOUS, ANXIOUS, OR ON EDGE: SEVERAL DAYS
GAD7 TOTAL SCORE: 4
2. NOT BEING ABLE TO STOP OR CONTROL WORRYING: SEVERAL DAYS

## 2023-11-16 ASSESSMENT — PATIENT HEALTH QUESTIONNAIRE - PHQ9
SUM OF ALL RESPONSES TO PHQ QUESTIONS 1-9: 2
5. POOR APPETITE OR OVEREATING: NOT AT ALL

## 2024-04-08 ENCOUNTER — MYC MEDICAL ADVICE (OUTPATIENT)
Dept: MIDWIFE SERVICES | Facility: CLINIC | Age: 34
End: 2024-04-08
Payer: COMMERCIAL

## 2024-04-08 DIAGNOSIS — F41.0 ANXIETY ATTACK: Primary | ICD-10-CM

## 2024-04-08 DIAGNOSIS — Z30.011 OCP (ORAL CONTRACEPTIVE PILLS) INITIATION: ICD-10-CM

## 2024-04-08 DIAGNOSIS — L70.0 ACNE VULGARIS: ICD-10-CM

## 2024-04-08 RX ORDER — HYDROXYZINE PAMOATE 25 MG/1
25 CAPSULE ORAL 4 TIMES DAILY PRN
Qty: 4 CAPSULE | Refills: 0 | Status: SHIPPED | OUTPATIENT
Start: 2024-04-08 | End: 2024-08-02

## 2024-04-08 NOTE — TELEPHONE ENCOUNTER
Requested Prescriptions   Pending Prescriptions Disp Refills    drospirenone-ethinyl estradiol (DAISY) 3-0.02 MG tablet 84 tablet 0     Sig: Take 1 tablet by mouth daily       Contraceptives Protocol Failed - 4/8/2024  9:49 AM        Failed - Recent (12 mo) or future (30 days) visit within the authorizing provider's specialty     The patient must have completed an in-person or virtual visit within the past 12 months or has a future visit scheduled within the next 90 days with the authorizing provider s specialty.  Urgent care and e-visits do not quality as an office visit for this protocol.          Passed - Patient is not a current smoker if age is 35 or older        Passed - Medication is active on med list        Passed - No active pregnancy on record        Passed - No positive pregnancy test in past 12 months            Last Written Prescription Date:  11/16/2023  Last Fill Quantity: 84,  # refills: 0   Last office visit: 11/16/2023;  prescribing provider:  CASH Whipple CNM  Future Office Visit:  none    Prescription approved per [facility] Refill Protocol.    Lor Stovall RN on 4/9/2024 at 10:49 AM

## 2024-04-08 NOTE — TELEPHONE ENCOUNTER
Please have patient follow-up with PCP. This provider not comfortable prescribing a controlled substance as I have not cared for this pt in the past.     Thank you,     Emma CASTREJON CNM

## 2024-04-08 NOTE — TELEPHONE ENCOUNTER
Pt last OV 11/16/2023 with Selam Krishnamurthy CNM    Pt requesting medication for flight anxiety - has upcoming trip    Routing pt mychart message to provider to advise.    Have pt follow up with a PCP?    Lor Stovall RN on 4/8/2024 at 1:27 PM

## 2024-04-08 NOTE — TELEPHONE ENCOUNTER
Pt states she has tried hydroxyzine in the past for situational anxiety and this has not helped for her.    Wondering if there is something else she can take instead    Routing pt Mobile Action[employer] message to provider to advise.    Lor Stovall RN on 4/8/2024 at 2:58 PM

## 2024-04-09 RX ORDER — DROSPIRENONE AND ETHINYL ESTRADIOL 0.02-3(28)
1 KIT ORAL DAILY
Qty: 84 TABLET | Refills: 2 | Status: SHIPPED | OUTPATIENT
Start: 2024-04-09 | End: 2024-04-10

## 2024-04-10 DIAGNOSIS — L70.0 ACNE VULGARIS: ICD-10-CM

## 2024-04-10 DIAGNOSIS — Z30.011 OCP (ORAL CONTRACEPTIVE PILLS) INITIATION: ICD-10-CM

## 2024-04-10 RX ORDER — DROSPIRENONE AND ETHINYL ESTRADIOL 0.02-3(28)
1 KIT ORAL DAILY
Qty: 84 TABLET | Refills: 1 | Status: SHIPPED | OUTPATIENT
Start: 2024-04-10 | End: 2024-08-02

## 2024-04-10 NOTE — TELEPHONE ENCOUNTER
Requested Prescriptions   Pending Prescriptions Disp Refills    drospirenone-ethinyl estradiol (DAISY) 3-0.02 MG tablet 84 tablet 2     Sig: Take 1 tablet by mouth daily       Contraceptives Protocol Failed - 4/10/2024  9:59 AM        Failed - Recent (12 mo) or future (30 days) visit within the authorizing provider's specialty     The patient must have completed an in-person or virtual visit within the past 12 months or has a future visit scheduled within the next 90 days with the authorizing provider s specialty.  Urgent care and e-visits do not quality as an office visit for this protocol.          Passed - Patient is not a current smoker if age is 35 or older        Passed - Medication is active on med list        Passed - No active pregnancy on record        Passed - No positive pregnancy test in past 12 months           Last Written Prescription Date:  4/9/24  Last Fill Quantity: 84,  # refills: 2   Last office visit: 11/16/23; last virtual visit: Visit date not found with prescribing provider:  Selam Krishnamurthy   Future Office Visit:  none found    Prescription approved per [facility] Refill Protocol.    Bertha Mcdaniels RN on 4/10/2024 at 10:03 AM

## 2024-07-18 DIAGNOSIS — F41.9 ANXIETY: ICD-10-CM

## 2024-07-18 RX ORDER — ESCITALOPRAM OXALATE 10 MG/1
10 TABLET ORAL DAILY
Qty: 90 TABLET | Refills: 1 | Status: SHIPPED | OUTPATIENT
Start: 2024-07-18 | End: 2024-08-02
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
## 2024-08-02 ENCOUNTER — OFFICE VISIT (OUTPATIENT)
Dept: MIDWIFE SERVICES | Facility: CLINIC | Age: 34
End: 2024-08-02
Payer: COMMERCIAL

## 2024-08-02 VITALS
BODY MASS INDEX: 24.1 KG/M2 | DIASTOLIC BLOOD PRESSURE: 68 MMHG | SYSTOLIC BLOOD PRESSURE: 120 MMHG | WEIGHT: 136 LBS | HEIGHT: 63 IN

## 2024-08-02 DIAGNOSIS — Z31.69 ENCOUNTER FOR PRECONCEPTION CONSULTATION: ICD-10-CM

## 2024-08-02 DIAGNOSIS — F41.9 ANXIETY: ICD-10-CM

## 2024-08-02 DIAGNOSIS — R68.82 DECREASED LIBIDO: ICD-10-CM

## 2024-08-02 DIAGNOSIS — N97.9 FEMALE INFERTILITY: Primary | ICD-10-CM

## 2024-08-02 DIAGNOSIS — Z30.09 FAMILY PLANNING: ICD-10-CM

## 2024-08-02 DIAGNOSIS — N92.6 IRREGULAR MENSTRUAL CYCLE: ICD-10-CM

## 2024-08-02 PROBLEM — Z30.011 OCP (ORAL CONTRACEPTIVE PILLS) INITIATION: Status: RESOLVED | Noted: 2023-02-02 | Resolved: 2024-08-02

## 2024-08-02 LAB
ANION GAP SERPL CALCULATED.3IONS-SCNC: 10 MMOL/L (ref 7–15)
BUN SERPL-MCNC: 10.8 MG/DL (ref 6–20)
CALCIUM SERPL-MCNC: 8.8 MG/DL (ref 8.8–10.4)
CHLORIDE SERPL-SCNC: 101 MMOL/L (ref 98–107)
CREAT SERPL-MCNC: 0.66 MG/DL (ref 0.51–0.95)
EGFRCR SERPLBLD CKD-EPI 2021: >90 ML/MIN/1.73M2
ESTRADIOL SERPL-MCNC: 137 PG/ML
FSH SERPL IRP2-ACNC: 4.3 MIU/ML
GLUCOSE SERPL-MCNC: 86 MG/DL (ref 70–99)
HBA1C MFR BLD: 5 % (ref 0–5.6)
HCG INTACT+B SERPL-ACNC: <1 MIU/ML
HCO3 SERPL-SCNC: 25 MMOL/L (ref 22–29)
LH SERPL-ACNC: 20.5 MIU/ML
MIS SERPL-MCNC: 7.37 NG/ML (ref 0.58–8.1)
POTASSIUM SERPL-SCNC: 3.9 MMOL/L (ref 3.4–5.3)
PROGEST SERPL-MCNC: 1.7 NG/ML
PROLACTIN SERPL 3RD IS-MCNC: 11 NG/ML (ref 5–23)
SHBG SERPL-SCNC: 73 NMOL/L (ref 30–135)
SODIUM SERPL-SCNC: 136 MMOL/L (ref 135–145)
TSH SERPL DL<=0.005 MIU/L-ACNC: 1.15 UIU/ML (ref 0.3–4.2)

## 2024-08-02 PROCEDURE — 84403 ASSAY OF TOTAL TESTOSTERONE: CPT | Performed by: NURSE PRACTITIONER

## 2024-08-02 PROCEDURE — 99459 PELVIC EXAMINATION: CPT | Performed by: NURSE PRACTITIONER

## 2024-08-02 PROCEDURE — 84702 CHORIONIC GONADOTROPIN TEST: CPT | Performed by: NURSE PRACTITIONER

## 2024-08-02 PROCEDURE — 99215 OFFICE O/P EST HI 40 MIN: CPT | Performed by: NURSE PRACTITIONER

## 2024-08-02 PROCEDURE — 82166 ASSAY ANTI-MULLERIAN HORM: CPT | Performed by: NURSE PRACTITIONER

## 2024-08-02 PROCEDURE — 84146 ASSAY OF PROLACTIN: CPT | Performed by: NURSE PRACTITIONER

## 2024-08-02 PROCEDURE — 84270 ASSAY OF SEX HORMONE GLOBUL: CPT | Performed by: NURSE PRACTITIONER

## 2024-08-02 PROCEDURE — 36415 COLL VENOUS BLD VENIPUNCTURE: CPT | Performed by: NURSE PRACTITIONER

## 2024-08-02 PROCEDURE — 80048 BASIC METABOLIC PNL TOTAL CA: CPT | Performed by: NURSE PRACTITIONER

## 2024-08-02 PROCEDURE — 82670 ASSAY OF TOTAL ESTRADIOL: CPT | Performed by: NURSE PRACTITIONER

## 2024-08-02 PROCEDURE — 84144 ASSAY OF PROGESTERONE: CPT | Performed by: NURSE PRACTITIONER

## 2024-08-02 PROCEDURE — 84443 ASSAY THYROID STIM HORMONE: CPT | Performed by: NURSE PRACTITIONER

## 2024-08-02 PROCEDURE — 82627 DEHYDROEPIANDROSTERONE: CPT | Performed by: NURSE PRACTITIONER

## 2024-08-02 PROCEDURE — G2211 COMPLEX E/M VISIT ADD ON: HCPCS | Performed by: NURSE PRACTITIONER

## 2024-08-02 PROCEDURE — 83002 ASSAY OF GONADOTROPIN (LH): CPT | Performed by: NURSE PRACTITIONER

## 2024-08-02 PROCEDURE — 83036 HEMOGLOBIN GLYCOSYLATED A1C: CPT | Performed by: NURSE PRACTITIONER

## 2024-08-02 PROCEDURE — 83001 ASSAY OF GONADOTROPIN (FSH): CPT | Performed by: NURSE PRACTITIONER

## 2024-08-02 RX ORDER — BUPROPION HYDROCHLORIDE 150 MG/1
150 TABLET ORAL EVERY MORNING
Qty: 30 TABLET | Refills: 0 | Status: SHIPPED | OUTPATIENT
Start: 2024-08-02 | End: 2024-09-01

## 2024-08-02 RX ORDER — CLOBETASOL PROPIONATE 0.5 MG/ML
SOLUTION TOPICAL PRN
COMMUNITY
Start: 2024-03-13

## 2024-08-02 RX ORDER — TRAZODONE HYDROCHLORIDE 50 MG/1
50 TABLET, FILM COATED ORAL AT BEDTIME
COMMUNITY
Start: 2024-04-01

## 2024-08-02 ASSESSMENT — ANXIETY QUESTIONNAIRES
2. NOT BEING ABLE TO STOP OR CONTROL WORRYING: SEVERAL DAYS
GAD7 TOTAL SCORE: 4
3. WORRYING TOO MUCH ABOUT DIFFERENT THINGS: SEVERAL DAYS
1. FEELING NERVOUS, ANXIOUS, OR ON EDGE: SEVERAL DAYS
7. FEELING AFRAID AS IF SOMETHING AWFUL MIGHT HAPPEN: NOT AT ALL
5. BEING SO RESTLESS THAT IT IS HARD TO SIT STILL: NOT AT ALL
GAD7 TOTAL SCORE: 4
6. BECOMING EASILY ANNOYED OR IRRITABLE: SEVERAL DAYS
IF YOU CHECKED OFF ANY PROBLEMS ON THIS QUESTIONNAIRE, HOW DIFFICULT HAVE THESE PROBLEMS MADE IT FOR YOU TO DO YOUR WORK, TAKE CARE OF THINGS AT HOME, OR GET ALONG WITH OTHER PEOPLE: NOT DIFFICULT AT ALL

## 2024-08-02 ASSESSMENT — PATIENT HEALTH QUESTIONNAIRE - PHQ9
5. POOR APPETITE OR OVEREATING: NOT AT ALL
SUM OF ALL RESPONSES TO PHQ QUESTIONS 1-9: 2

## 2024-08-05 LAB — DHEA-S SERPL-MCNC: 230 UG/DL (ref 35–430)

## 2024-08-06 ENCOUNTER — MYC MEDICAL ADVICE (OUTPATIENT)
Dept: MIDWIFE SERVICES | Facility: CLINIC | Age: 34
End: 2024-08-06
Payer: COMMERCIAL

## 2024-08-06 LAB
TESTOST FREE SERPL-MCNC: 0.57 NG/DL
TESTOST SERPL-MCNC: 54 NG/DL (ref 8–60)

## 2024-08-06 NOTE — TELEPHONE ENCOUNTER
Routing pt [employer] message to provider to advise.  Kathy Campbell RN on 8/6/2024 at 11:06 AM   [facility]

## 2024-09-06 ENCOUNTER — ANCILLARY PROCEDURE (OUTPATIENT)
Dept: ULTRASOUND IMAGING | Facility: CLINIC | Age: 34
End: 2024-09-06
Attending: NURSE PRACTITIONER
Payer: COMMERCIAL

## 2024-09-06 ENCOUNTER — OFFICE VISIT (OUTPATIENT)
Dept: OBGYN | Facility: CLINIC | Age: 34
End: 2024-09-06
Attending: NURSE PRACTITIONER
Payer: COMMERCIAL

## 2024-09-06 VITALS
BODY MASS INDEX: 24.63 KG/M2 | HEIGHT: 63 IN | DIASTOLIC BLOOD PRESSURE: 62 MMHG | SYSTOLIC BLOOD PRESSURE: 110 MMHG | WEIGHT: 139 LBS

## 2024-09-06 DIAGNOSIS — Z31.69 ENCOUNTER FOR PRECONCEPTION CONSULTATION: ICD-10-CM

## 2024-09-06 DIAGNOSIS — N97.9 FEMALE INFERTILITY: ICD-10-CM

## 2024-09-06 DIAGNOSIS — N92.6 IRREGULAR MENSTRUAL CYCLE: ICD-10-CM

## 2024-09-06 DIAGNOSIS — Q51.810 ARCUATE UTERUS: ICD-10-CM

## 2024-09-06 DIAGNOSIS — E28.2 PCOS (POLYCYSTIC OVARIAN SYNDROME): ICD-10-CM

## 2024-09-06 DIAGNOSIS — N91.4 SECONDARY OLIGOMENORRHEA: Primary | ICD-10-CM

## 2024-09-06 PROCEDURE — 99215 OFFICE O/P EST HI 40 MIN: CPT | Performed by: OBSTETRICS & GYNECOLOGY

## 2024-09-06 PROCEDURE — 76830 TRANSVAGINAL US NON-OB: CPT | Performed by: OBSTETRICS & GYNECOLOGY

## 2024-09-06 PROCEDURE — G2211 COMPLEX E/M VISIT ADD ON: HCPCS | Performed by: OBSTETRICS & GYNECOLOGY

## 2024-09-06 RX ORDER — MEDROXYPROGESTERONE ACETATE 10 MG
10 TABLET ORAL DAILY
Qty: 10 TABLET | Refills: 0 | Status: SHIPPED | OUTPATIENT
Start: 2024-09-06

## 2024-09-06 NOTE — PATIENT INSTRUCTIONS
Fertility Treatment and Evaluation CPT Codes        For pricing on the below interventions, you will contact the Consumer Price Line at [phone],  and have your insurance information ready. They will ask for the codes, this will then be used to provide you the best estimation on cost of a particular study/test/medication.  Also call your insurance to be sure you have a good understanding of your benefits. For example, do you have infertility benefits? Is fertility testing covered?        Testing/Procedures:    Semen Analysis: 41319 [We send patients to [facility] ([phone]), call for pricing and their insurance process]    HSG (Hysterosalpingogram): 09191 (for pricing you will also want to check with [facility] for their pricing for Radiologist as well as the Consumer Price Line for your doctor)      Procedures/Imaging/Testing:    Follicle Study: 58369    IUI (insemination): 67720    Sperm washin      Medications:    Femara or Clomid (ovulation induction), and Ovidrel (ovulation trigger injection): These are prescription medications that will be prescribed to you if needed. You can call your insurance to check if they are covered. You may also check [employer] for pricing outside of insurance as this may vary by pharmacy.       Instructions:    -[employer] message us when your  does the semen analysis so we can keep an eye out for it.     -The menstrual cycle is timed with the first day of bleeding = Day 1.   -Typically fertility peaks midcycle, around Day 14. This can vary earlier or later for some women    -In general, intercourse should take place Days 10-18. This can be done every day to every other day.     -To get your menstrual cycle going, take provera (progesterone) daily for 10 days. Bleeding will start within a couple days to a week. If no bleeding, call the clinic.     -Call the clinic on Day 1 to get a prescription for the letrazole (femara). You can then schedule your follicle  study (ultrasound) at this time as well for cycle day 14.     -The Femara medication is taken on Days 5-9 to help you to ovulate. Take a pregnancy test before starting the medication. If it is negative, then the medication can be taken. If it is positive, call the clinic.     -A follicle study (ultrasound exam) is performed around Day 14 to see if the medication is working. You meet with a doctor afterward to then discuss the ultrasound and what the next steps in the plan are.
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
## 2024-09-08 PROBLEM — N91.4 SECONDARY OLIGOMENORRHEA: Status: ACTIVE | Noted: 2024-09-08

## 2024-09-08 PROBLEM — E28.2 PCOS (POLYCYSTIC OVARIAN SYNDROME): Status: ACTIVE | Noted: 2024-09-08

## 2024-09-08 PROBLEM — Q51.810 ARCUATE UTERUS: Status: ACTIVE | Noted: 2024-09-08

## 2024-09-09 ENCOUNTER — TRANSFERRED RECORDS (OUTPATIENT)
Dept: HEALTH INFORMATION MANAGEMENT | Facility: CLINIC | Age: 34
End: 2024-09-09
Payer: COMMERCIAL

## 2024-10-01 ENCOUNTER — TELEPHONE (OUTPATIENT)
Dept: OBGYN | Facility: CLINIC | Age: 34
End: 2024-10-01
Payer: COMMERCIAL

## 2024-10-01 NOTE — TELEPHONE ENCOUNTER
[facility] Call Center    Phone Message    May a detailed message be left on voicemail: yes     Reason for Call: Appointment Intake    Referring Provider Name: Dr. Yo  Diagnosis and/or Symptoms: US Follicular Follow Up and same-day provider follow-up visit     Action Taken: Other: [facility]    Travel Screening: Not Applicable     Date of Service:             Pt states she needs this scheduled on 10/14 based on her cycle, but writer unable to find any openings for U/S and provider visit same day. Please advise and reach back out to pt.

## 2024-10-07 PROBLEM — R68.82 DECREASED LIBIDO: Status: RESOLVED | Noted: 2024-08-02 | Resolved: 2024-10-07

## 2024-10-07 NOTE — PROGRESS NOTES
Melanie Dugan is a 34 year old female who is being evaluated via a patient initiated billable telephone visit.     What phone number would you like to be contacted at? [phone]   How would you like to obtain your AVS? MyChart      Originating Location (pt. Location): Home      Distant Location (provider location):  On-site      OBJECTIVE:     No vitals were obtained today due to virtual telephone visit.    ASSESSMENT/PLAN:                                                      Visit duration: 25 minutes    Melanie Dugan  is here for follicle study on Day # 14 of cycle.     This is cycle 1 of Femara.    She has taken Femara 2.5 mg Day #5-9 of this cycle.    Follicle study shows Normal uterus with trilaminar EMS measuring 5 mm  There are many antral follicles on both ovaries bilaterally consistent with stimulated polycystic ovaries bilaterally but no dominant follicle noted  No free fluid in the pelvis     Francisca Gonzalez MD.      ASSESSMENT/PLAN:                                                      Visit duration: 25 minutes      ICD-10-CM    1. Infertility, anovulation  N97.0 letrozole (FEMARA) 2.5 MG tablet     US Follicular Follow Up      2. Female infertility associated with male factors  Z31.81 letrozole (FEMARA) 2.5 MG tablet    N97.8 US Follicular Follow Up      3. PCOS (polycystic ovarian syndrome)  E28.2       4. Procreative management  Z31.9         Patient with clinical features of PCOS and this is supported by previous U/S and her high AMH of 7.7 2 months ago.  There is also a mild male factor to infertility with morphology at 2% normal and slightly low counts.      With this first cycle of femara at low dose she has many antral follicles but none are dominant which isn't unexpected in PCOS    Discussed doing stair step approach and doing 5 mg for 5 more days and then repeating another follicle study 5 days after that.    Based on her schedule and availability she will start 5 mg femara tomorrow though  10/19 and then will need another follicle study on 10/24  Normally a patient of Dr. Yo so will see if she is available    Discussed I.C timing from now until then and likely would recommend IUI given male factor, when timing is appropriate    Discussed that would go to 7.5 mg femara at most, could definitely then consider a course or two of clomid and +/- adding metformin  If none of that worked to induce ovulation then that is when referral for [facility]/injectibles is made.    All questions answered and will have schedulers contact her for f.s appointment on 10/24

## 2024-10-14 ENCOUNTER — ANCILLARY PROCEDURE (OUTPATIENT)
Dept: ULTRASOUND IMAGING | Facility: CLINIC | Age: 34
End: 2024-10-14
Attending: OBSTETRICS & GYNECOLOGY
Payer: COMMERCIAL

## 2024-10-14 ENCOUNTER — VIRTUAL VISIT (OUTPATIENT)
Dept: OBGYN | Facility: CLINIC | Age: 34
End: 2024-10-14
Attending: OBSTETRICS & GYNECOLOGY
Payer: COMMERCIAL

## 2024-10-14 DIAGNOSIS — N91.4 SECONDARY OLIGOMENORRHEA: ICD-10-CM

## 2024-10-14 DIAGNOSIS — Z31.9 PROCREATIVE MANAGEMENT: ICD-10-CM

## 2024-10-14 DIAGNOSIS — N97.8 FEMALE INFERTILITY ASSOCIATED WITH MALE FACTORS: ICD-10-CM

## 2024-10-14 DIAGNOSIS — Z31.81 FEMALE INFERTILITY ASSOCIATED WITH MALE FACTORS: ICD-10-CM

## 2024-10-14 DIAGNOSIS — E28.2 PCOS (POLYCYSTIC OVARIAN SYNDROME): ICD-10-CM

## 2024-10-14 DIAGNOSIS — N97.0 INFERTILITY, ANOVULATION: Primary | ICD-10-CM

## 2024-10-14 PROCEDURE — 99443 PR PHYSICIAN TELEPHONE EVALUATION 21-30 MIN: CPT | Mod: 93 | Performed by: OBSTETRICS & GYNECOLOGY

## 2024-10-14 PROCEDURE — 76830 TRANSVAGINAL US NON-OB: CPT | Performed by: OBSTETRICS & GYNECOLOGY

## 2024-10-14 RX ORDER — LETROZOLE 2.5 MG/1
TABLET, FILM COATED ORAL
Qty: 10 TABLET | Refills: 0 | Status: SHIPPED | OUTPATIENT
Start: 2024-10-14 | End: 2024-11-12

## 2024-10-14 RX ORDER — HYDROXYZINE HYDROCHLORIDE 25 MG/1
25 TABLET, FILM COATED ORAL 3 TIMES DAILY PRN
COMMUNITY

## 2024-10-24 ENCOUNTER — ANCILLARY PROCEDURE (OUTPATIENT)
Dept: ULTRASOUND IMAGING | Facility: CLINIC | Age: 34
End: 2024-10-24
Attending: OBSTETRICS & GYNECOLOGY
Payer: COMMERCIAL

## 2024-10-24 ENCOUNTER — VIRTUAL VISIT (OUTPATIENT)
Dept: OBGYN | Facility: CLINIC | Age: 34
End: 2024-10-24
Attending: OBSTETRICS & GYNECOLOGY
Payer: COMMERCIAL

## 2024-10-24 DIAGNOSIS — N97.8 FEMALE INFERTILITY ASSOCIATED WITH MALE FACTORS: ICD-10-CM

## 2024-10-24 DIAGNOSIS — Z31.83 ENCOUNTER FOR ASSISTED REPRODUCTIVE FERTILITY CYCLE: ICD-10-CM

## 2024-10-24 DIAGNOSIS — N97.0 INFERTILITY, ANOVULATION: ICD-10-CM

## 2024-10-24 DIAGNOSIS — N97.0 INFERTILITY, ANOVULATION: Primary | ICD-10-CM

## 2024-10-24 DIAGNOSIS — Z31.81 FEMALE INFERTILITY ASSOCIATED WITH MALE FACTORS: ICD-10-CM

## 2024-10-24 DIAGNOSIS — E28.2 PCOS (POLYCYSTIC OVARIAN SYNDROME): ICD-10-CM

## 2024-10-24 PROCEDURE — 99213 OFFICE O/P EST LOW 20 MIN: CPT | Mod: 95 | Performed by: OBSTETRICS & GYNECOLOGY

## 2024-10-24 PROCEDURE — 76830 TRANSVAGINAL US NON-OB: CPT | Performed by: OBSTETRICS & GYNECOLOGY

## 2024-10-24 RX ORDER — LETROZOLE 2.5 MG/1
5 TABLET, FILM COATED ORAL DAILY
Qty: 10 TABLET | Refills: 0 | Status: SHIPPED | OUTPATIENT
Start: 2024-10-24 | End: 2024-10-29

## 2024-10-24 NOTE — PROGRESS NOTES
Melanie Dugan is a 34 year old female who is being evaluated for a billable video visit.     What phone number would you like to be contacted at? [phone]  How would you like to obtain your AVS? MyChart      Originating Location (pt. Location): HOME       Distant Location (provider location):  [facility]     Melanie Dugan  is here for follicle study on Day # 15 of cycle.     This is cycle 2 of Femara.    She has taken Femara 2.5 mg Day #5-9 of this cycle.    Follicle study shows   Results for orders placed or performed in visit on 10/24/24   US Follicular Follow Up    Narrative    Gynecological Ultrasound Report  Pelvic U/S - Follicle Study - Transvaginal  [facility]  Referring physician: Francisca Gonzalez MD  Sonographer: Shannan Tolbert RDMS  Indication: Infertility, anovulation  LMP: Patient's last menstrual period was 10/01/2024 (exact date).  History: Femara     Follicle Study:   Cycle Day: 24  Endometrium: Thickness total 6.5mm Trilaminar  Right Ovary: 3.6 x 2.3 x 2.3cm.    Follicles > 10 mm: 1) 15.1 mm and 2) 11 mm  Left Ovary: 4.1 x 3.7 x 3.8cm.    Follicles  > 10 mm: 1) 29.3 mm  Cul de Sac/Pouch of William: No free fluid  Technique: Transvaginal Imaging performed     Impression:   Endometrium 6.5 mm trilaminar.  Right ovary with 15 mm follicle, left   ovary with 29.3 mm dominant follicle.  No free fluid.    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 Male factor infertility as well. Assisted repro cycle with femara.   Reviewed ultrasound findings with Melanie. Had appropriate response to stair step 2.5mg->5mg Femara x 5d.   Lov with dominant follicle 29mm. Sujit PIZARRO.  Discussed  IUI, considering  hx of low morph and low concentration. Discussed it is Thurs, and partner [facility] clinic would be needed for IUI on a Saturday. She elects to do natural cycle this mo.  Will have them do TI every other day starting today.   If no period in 2wk, take HCG. If negative, wait another 1wk and repeat HCG. If negative, call us for provera to induce period. If HCG positive at any point, contact us.   For next cycle, rec Femara 5mg days 5-9. Take HCG right before taking the Femara. RX sent  Questions answered to her satisfaction.     Hanna Pretty Masters, DO  [facility]

## 2024-11-25 ENCOUNTER — MYC MEDICAL ADVICE (OUTPATIENT)
Dept: OBGYN | Facility: CLINIC | Age: 34
End: 2024-11-25

## 2024-11-25 ENCOUNTER — OFFICE VISIT (OUTPATIENT)
Dept: OBGYN | Facility: CLINIC | Age: 34
End: 2024-11-25
Attending: OBSTETRICS & GYNECOLOGY
Payer: COMMERCIAL

## 2024-11-25 ENCOUNTER — ANCILLARY PROCEDURE (OUTPATIENT)
Dept: ULTRASOUND IMAGING | Facility: CLINIC | Age: 34
End: 2024-11-25
Attending: OBSTETRICS & GYNECOLOGY
Payer: COMMERCIAL

## 2024-11-25 VITALS
SYSTOLIC BLOOD PRESSURE: 88 MMHG | DIASTOLIC BLOOD PRESSURE: 60 MMHG | WEIGHT: 141.4 LBS | HEIGHT: 63 IN | BODY MASS INDEX: 25.05 KG/M2

## 2024-11-25 DIAGNOSIS — N97.0 INFERTILITY, ANOVULATION: ICD-10-CM

## 2024-11-25 DIAGNOSIS — N97.0 INFERTILITY, ANOVULATION: Primary | ICD-10-CM

## 2024-11-25 DIAGNOSIS — Z31.81 FEMALE INFERTILITY ASSOCIATED WITH MALE FACTORS: ICD-10-CM

## 2024-11-25 DIAGNOSIS — N97.8 FEMALE INFERTILITY ASSOCIATED WITH MALE FACTORS: ICD-10-CM

## 2024-11-25 PROCEDURE — 99214 OFFICE O/P EST MOD 30 MIN: CPT | Performed by: OBSTETRICS & GYNECOLOGY

## 2024-11-25 PROCEDURE — G2211 COMPLEX E/M VISIT ADD ON: HCPCS | Performed by: OBSTETRICS & GYNECOLOGY

## 2024-11-25 PROCEDURE — 76830 TRANSVAGINAL US NON-OB: CPT

## 2024-11-25 RX ORDER — DIPHENHYDRAMINE HCL 25 MG
25 TABLET ORAL EVERY 6 HOURS PRN
COMMUNITY
End: 2024-11-27

## 2024-11-25 RX ORDER — VITAMIN A, VITAMIN C, VITAMIN D-3, VITAMIN E, VITAMIN B-1, VITAMIN B-2, NIACIN, VITAMIN B-6, CALCIUM, IRON, ZINC, COPPER 4000; 120; 400; 22; 1.84; 3; 20; 10; 1; 12; 200; 27; 25; 2 [IU]/1; MG/1; [IU]/1; MG/1; MG/1; MG/1; MG/1; MG/1; MG/1; UG/1; MG/1; MG/1; MG/1; MG/1
TABLET ORAL DAILY
COMMUNITY

## 2024-11-25 NOTE — PROGRESS NOTES
Melanie Dugan  is here for follicle study on Day # 15 of cycle.     This is cycle 2 of Femara but the first one at 5mg needig to stairstep with her first cycle form 2.5 to 5 mg, so just straight to 5 mg this time    She has taken femara 5mg on day 5-9 of cycle    Follicle study shows EMS that is trilaminar and 8.2 mm  On the right there is a 20 mm and 14 mm   On the left there is a 26.7 mm, 19 mm and 11 mm    Discussed today's U/s results.    For first cycle did timed IC but given that  has h.o testicular torsion and his S.A did show 2% normal morph and just slightly low counts at 53 mil with >54 mil considered normal, they do want to proceed with IUI this cycle.      Discussed that with femara 2.5mg she failed to stimulate and now with 5mg she has 3 fully dominant follicles.  Reviewed risk of multiples, mostly based on clomid and extrapolated to femara and it is 6-10% of twins and <1% for triplets.  Head to head femara was better at recruiting singular follicle and pregnancy compared to clomid but in her case at her young age, high AMH, anovulation she has straight PCOS for her infertility factor and so viablity and genetic normal quality of all three of these is likely good in contrast to low AMH and older age    Discussed in depth twins and triplets and risks.  Reviewed that would likely have di/di twins if had them which is lower risk but can't exclude some mono factors of just risk alone.  After weighing the pros/cons she would like to proceed.  Will discuss with  after appointment and if he is opposed they will cancel IUI but for now feels she does want to proceed.    Rx for ovidrel sent to [facility] on [address] and instructed her on use and storing in fridge until use  Will make IUI appointment for Wednesday AM and do her ovidrel 36-40 hours prior to that appointment.  Ok to have I.C today but not tomorrow and reviewed pros/cons of too close to IUI time but also if too long and the quality of the  sperm.  Do not run a true S.A for our IUI like at an [facility] clinic so won't have any specifics of specimen quality but it is still likely the best option vs timing and location and costs of doing IUI not in our office, and patient agrees.    Return Wednesday for first IUI.      30  minutes  was spent on direct management of the patient's other medical issue(s) as above, including chart review and chart completion, on the same DOS

## 2024-11-27 ENCOUNTER — ALLIED HEALTH/NURSE VISIT (OUTPATIENT)
Dept: OBGYN | Facility: CLINIC | Age: 34
End: 2024-11-27
Payer: COMMERCIAL

## 2024-11-27 ENCOUNTER — OFFICE VISIT (OUTPATIENT)
Dept: OBGYN | Facility: CLINIC | Age: 34
End: 2024-11-27
Payer: COMMERCIAL

## 2024-11-27 VITALS — BODY MASS INDEX: 24.98 KG/M2 | WEIGHT: 141 LBS | SYSTOLIC BLOOD PRESSURE: 110 MMHG | DIASTOLIC BLOOD PRESSURE: 64 MMHG

## 2024-11-27 DIAGNOSIS — Z31.9 PROCREATIVE MANAGEMENT: Primary | ICD-10-CM

## 2024-11-27 DIAGNOSIS — N97.0 INFERTILITY, ANOVULATION: ICD-10-CM

## 2024-11-27 DIAGNOSIS — Z31.81 FEMALE INFERTILITY ASSOCIATED WITH MALE FACTORS: ICD-10-CM

## 2024-11-27 DIAGNOSIS — N97.8 FEMALE INFERTILITY ASSOCIATED WITH MALE FACTORS: ICD-10-CM

## 2024-11-27 NOTE — PATIENT INSTRUCTIONS
IUI performed without issues today.  Will call with either positive home pregnancy test or first day of period for new round of femara 5mg.

## 2024-11-27 NOTE — PROGRESS NOTES
INDICATIONS:                                                      Is a pregnancy test required: No.  Was a consent obtained?  Yes    Melanie Dugan is here for {[facility] Insem:942776}. She is on cycle day ***. She is taking {[facility] Insem Med:266711} this cycle. Insemination is timed by {[facility] Trigger:263164}. This is insemination number ***.  {[facility] SPERM:480003}    Today's PHQ-2 Score:       8/2/2024     8:13 AM   PHQ-2 ( 1999 [employer])   Q1: Little interest or pleasure in doing things 0   Q2: Feeling down, depressed or hopeless 0   PHQ-2 Score 0       PROCEDURE:                                                      After identification and confirmation of the specimen, the patient agreed to proceed. Speculum was inserted into the vagina. Cervix was visualized. The specimen was drawn up into the insemination catheter. The catheter {WAS/WAS NOT:9033} advanced into the uterine cavity and the specimen was instilled.    POST  PROCEDURE:                                                      The patient {DID:154910} experience cramping afterwards.  She rested on her back for 10 minutes. She  {PLC Tolerance:360958}. There were no complications. Patient was discharged in stable condition.    {[facility] INSEM INSTR:076726}      [facility] NURSE EDUCATION

## 2024-11-27 NOTE — PROGRESS NOTES
INDICATIONS:                                                      Is a pregnancy test required: No.    Melanie Dugan is here for AIH. She is on cycle day 17. She is taking Femara this cycle. Insemination is timed by HCG injection. This is insemination number 1.  First cycle in October was stair stepped from 2.5mg to 5mg and then followed with timed IC.    Fresh specimen was washed and spun in the centrifuge before insemination.     Today's PHQ-2 Score:       8/2/2024     8:13 AM   PHQ-2 ( 1999 [employer])   Q1: Little interest or pleasure in doing things 0   Q2: Feeling down, depressed or hopeless 0   PHQ-2 Score 0       PROCEDURE:                                                      After identification and confirmation of the specimen, the patient agreed to proceed. Speculum was inserted into the vagina. Cervix was visualized.   Initial pass with catheter was performed without issue.  The specimen was drawn up into the insemination catheter. The catheter was not ae to be advanced into the uterine cavity.  Single tooth tenaculum was attached to anterior lip of cervix and os finder used to gently dilate internal os.  Catheter was then able to be advanced and the specimen was instilled.    POST  PROCEDURE:                                                      The patient did experience mild cramping afterwards.  She rested on her back for 10 minutes. She  tolerated the procedure well. There were no complications. Patient was discharged in stable condition.    Patient Instructions   IUI performed without issues today.  Will call with either positive home pregnancy test or first day of period for new round of femara 5mg.        Karen Fisher MD

## 2024-12-04 DIAGNOSIS — Z31.9 PROCREATIVE MANAGEMENT: Primary | ICD-10-CM

## 2024-12-04 RX ORDER — PNV NO.95/FERROUS FUM/FOLIC AC 28MG-0.8MG
1 TABLET ORAL DAILY
Qty: 90 TABLET | Refills: 3 | Status: SHIPPED | OUTPATIENT
Start: 2024-12-04

## 2024-12-04 NOTE — TELEPHONE ENCOUNTER
Prescription approved per [facility] Refill Protocol.  Sarah Cheng RN on 12/4/2024 at 10:35 AM

## 2024-12-04 NOTE — TELEPHONE ENCOUNTER
Requested Prescriptions   Pending Prescriptions Disp Refills    Prenatal Vit-Fe Fumarate-FA (PRENATAL VITAMIN) 27-0.8 MG TABS       Sig: Take by mouth.       There is no refill protocol information for this order        Last Written Prescription Date:  09/10/2024  Last Fill Quantity: 90,  # refills: 0   Last office visit: 11/27/2024 ; last virtual visit: 10/24/2024 with prescribing provider:  Holly Browne CNM   Future Office Visit:  no future appointment schedule.

## 2024-12-26 ENCOUNTER — OFFICE VISIT (OUTPATIENT)
Dept: OBGYN | Facility: CLINIC | Age: 34
End: 2024-12-26
Attending: OBSTETRICS & GYNECOLOGY
Payer: COMMERCIAL

## 2024-12-26 ENCOUNTER — TELEPHONE (OUTPATIENT)
Dept: OBGYN | Facility: CLINIC | Age: 34
End: 2024-12-26

## 2024-12-26 ENCOUNTER — ANCILLARY PROCEDURE (OUTPATIENT)
Dept: ULTRASOUND IMAGING | Facility: CLINIC | Age: 34
End: 2024-12-26
Attending: OBSTETRICS & GYNECOLOGY
Payer: COMMERCIAL

## 2024-12-26 VITALS — DIASTOLIC BLOOD PRESSURE: 78 MMHG | BODY MASS INDEX: 25.33 KG/M2 | WEIGHT: 143 LBS | SYSTOLIC BLOOD PRESSURE: 120 MMHG

## 2024-12-26 DIAGNOSIS — Z31.9 PROCREATIVE MANAGEMENT: ICD-10-CM

## 2024-12-26 DIAGNOSIS — N97.8 FEMALE INFERTILITY ASSOCIATED WITH MALE FACTORS: ICD-10-CM

## 2024-12-26 DIAGNOSIS — E28.2 PCOS (POLYCYSTIC OVARIAN SYNDROME): ICD-10-CM

## 2024-12-26 DIAGNOSIS — N97.8 FEMALE INFERTILITY ASSOCIATED WITH MALE FACTORS: Primary | ICD-10-CM

## 2024-12-26 DIAGNOSIS — Z31.81 FEMALE INFERTILITY ASSOCIATED WITH MALE FACTORS: ICD-10-CM

## 2024-12-26 DIAGNOSIS — Z31.81 FEMALE INFERTILITY ASSOCIATED WITH MALE FACTORS: Primary | ICD-10-CM

## 2024-12-26 PROCEDURE — 76830 TRANSVAGINAL US NON-OB: CPT

## 2024-12-26 NOTE — PATIENT INSTRUCTIONS
Follicle study reviewed today with Melanei; will plan to trigger tonight with ovidrel and arrange weekend IUI with [facility] on Saturday 12/28.  Will call with either positive pregnancy test or period in ~2wks.  Would recommend repeat cycle with femara 5mg D5-9 as needed.

## 2024-12-26 NOTE — PROGRESS NOTES
Melanie Dugan  is here for follicle study on Day # 14 of cycle.     This is cycle 4 of Femara.    She has taken Femara 5 mg Day #5-9 of this cycle.    Follicle study shows trilaminar endometrial lining measuring 8.1mm and right ovarian follicle measuring 20mm.  Left ovary quiet.      Patient Instructions   Follicle study reviewed today with Melanie; will plan to trigger tonight with ovidrel and arrange weekend IUI with [facility] on Saturday 12/28.  Will call with either positive pregnancy test or period in ~2wks.  Would recommend repeat cycle with femara 5mg D5-9 as needed.        Total Visit Time: 22 minutes with review of chart, imaging, patient visit and plan.     Karen Fisher MD

## 2024-12-26 NOTE — TELEPHONE ENCOUNTER
Seen by Dr Fisher after FS US 12/26/24  Orders faxed to [facility] for weekend IUI: 12/28 Saturday    RN called CRM 4 times Thursday afternoon - waited on hold 3 times, left detailed vm on Magy's inbox to call back  Clinic closed at 1600 and RN never heard from anyone from CRM to schedule weekend IUI    Dr Fisher aware and ordered to have pt given her ovidrel/trigger shot at 2230 tonight in hopes we can get her in on Saturday for IUI    RNs will call at 0800 Friday to schedule the weekend IUI and call pt    Pt is aware to give trigger shot this evening, sent her address info for CRM.  She will await call Friday for times for  and herself for weekend IUI    Sarah Cheng RN on 12/26/2024 at 4:23 PM

## 2024-12-27 NOTE — TELEPHONE ENCOUNTER
[facility] Call Center    Phone Message    May a detailed message be left on voicemail: yes     Reason for Call: Other: . Writer informed of the below information, however she does have an additional question, does the payment have to be collected up front, or can it be billed to insurance? Pt stated that might be a question for CRM, but wanted to see if the RN might know the answer, please reach out to pt via [employer] or phone call, thank you!    Action Taken: Message routed to:  Other: OBGYN    Travel Screening: Not Applicable     Date of Service:

## 2024-12-27 NOTE — TELEPHONE ENCOUNTER
Called and spoke with Zulay from [facility]    Pt and partner are scheduled for IUI tomorrow 12/28  Isma's appt time at 0800am  Melanie's appt time 10:45am    Attempted to call pt to let her know appt times, LMTCB    Also sent pt [employer] message with information  Pt to contact clinic to let us know she received information and if she has further questions    Lor Stovall RN on 12/27/2024 at 8:54 AM  [facility] Triage

## 2024-12-27 NOTE — TELEPHONE ENCOUNTER
Called and spoke to patient, relayed appointment times again for pt, [facility]    Relayed to pt that payment would be collected at time of appt. Pt verbalized understanding    Routing to provider as LURDES Stovall RN on 12/27/2024 at 10:36 AM  [facility] Triage

## 2024-12-28 ENCOUNTER — MEDICAL CORRESPONDENCE (OUTPATIENT)
Dept: HEALTH INFORMATION MANAGEMENT | Facility: CLINIC | Age: 34
End: 2024-12-28
Payer: COMMERCIAL

## 2025-01-02 ENCOUNTER — TELEPHONE (OUTPATIENT)
Dept: SLEEP MEDICINE | Facility: CLINIC | Age: 35
End: 2025-01-02
Payer: COMMERCIAL

## 2025-01-02 NOTE — TELEPHONE ENCOUNTER
Hello,    Patient is looking to refill their Hydroxyzine 25mg Tablets.    Thank you,    Mary Lundberg, T  [facility]

## 2025-01-06 DIAGNOSIS — F41.9 ANXIETY: Primary | ICD-10-CM

## 2025-01-06 RX ORDER — HYDROXYZINE HYDROCHLORIDE 25 MG/1
25 TABLET, FILM COATED ORAL
Qty: 180 TABLET | Refills: 0 | Status: SHIPPED | OUTPATIENT
Start: 2025-01-06 | End: 2025-04-06

## 2025-01-12 ENCOUNTER — HEALTH MAINTENANCE LETTER (OUTPATIENT)
Age: 35
End: 2025-01-12

## 2025-01-27 ENCOUNTER — ANCILLARY PROCEDURE (OUTPATIENT)
Dept: ULTRASOUND IMAGING | Facility: CLINIC | Age: 35
End: 2025-01-27
Attending: OBSTETRICS & GYNECOLOGY
Payer: COMMERCIAL

## 2025-01-27 ENCOUNTER — OFFICE VISIT (OUTPATIENT)
Dept: OBGYN | Facility: CLINIC | Age: 35
End: 2025-01-27
Attending: OBSTETRICS & GYNECOLOGY
Payer: COMMERCIAL

## 2025-01-27 VITALS
BODY MASS INDEX: 25.3 KG/M2 | HEIGHT: 63 IN | SYSTOLIC BLOOD PRESSURE: 122 MMHG | WEIGHT: 142.8 LBS | DIASTOLIC BLOOD PRESSURE: 70 MMHG

## 2025-01-27 DIAGNOSIS — Z31.9 PROCREATIVE MANAGEMENT: ICD-10-CM

## 2025-01-27 DIAGNOSIS — E28.2 PCOS (POLYCYSTIC OVARIAN SYNDROME): Primary | ICD-10-CM

## 2025-01-27 DIAGNOSIS — N97.8 FEMALE INFERTILITY ASSOCIATED WITH MALE FACTORS: ICD-10-CM

## 2025-01-27 DIAGNOSIS — E28.2 PCOS (POLYCYSTIC OVARIAN SYNDROME): ICD-10-CM

## 2025-01-27 DIAGNOSIS — Z31.81 FEMALE INFERTILITY ASSOCIATED WITH MALE FACTORS: ICD-10-CM

## 2025-01-27 PROCEDURE — 99213 OFFICE O/P EST LOW 20 MIN: CPT | Performed by: OBSTETRICS & GYNECOLOGY

## 2025-01-27 PROCEDURE — 76830 TRANSVAGINAL US NON-OB: CPT | Performed by: OBSTETRICS & GYNECOLOGY

## 2025-01-27 NOTE — Clinical Note
Bronwyn Ferreira, Would you be willing to do this patient's IUI on Wednesday?  I'm off since I worked today so can't add her on to my own schedule.  She's currently scheduled with Nat but would appreciate doing it with you if you're willing and your schedule/call allows.  Thanks!  Just let ghulam know?

## 2025-01-27 NOTE — PROGRESS NOTES
Melanie Dugan  is here for follicle study on Day # 14 of cycle.     This is cycle 4 of Femara.  Stair stepped with her first cycle from 2.5mg to 5mg and did timed IC.  Did IUI with us on her 2nd cycle and did IUI with CRM last cycle.  Tolerating medications well.     She has taken Femara 5 mg Day #5-9 of this cycle.    Follicle study shows trilaminar endometrial lining measuring 6mm and 24mm right sided follicle.  Left ovary quiet.  No free fluid.      Patient Instructions   Follicle study results reviewed today with Melanie.  With right sided follicle, will trigger tonight with ovidrel and do insemination on Wednesday morning.    Reassurance given that the process can unfortunately take time; would consider referral to [facility] if no success within 6 cycles of ovarian stimulation +/- IUI.      25 min spent on day of visit with chart review, imaging review, patient visit and counseling, documentation and planning.

## 2025-01-27 NOTE — PATIENT INSTRUCTIONS
Follicle study results reviewed today with Melanie.  With right sided follicle, will trigger tonight with ovidrel and do insemination on Wednesday morning.    Reassurance given that the process can unfortunately take time; would consider referral to [facility] if no success within 6 cycles of ovarian stimulation +/- IUI.

## 2025-01-28 NOTE — PROGRESS NOTES
INDICATIONS:                                                      Is a pregnancy test required: No.  Was a consent obtained?  Yes    Melanie Dugan is here for AIH. She is on cycle 16 day. She has taken Femara 5 mg Day #5-9 of this cycle.  Insemination is timed by HCG injection. [facility] with Dr Fisher.  Fresh specimen was washed and spun in the centrifuge before insemination.     Today's PHQ-2 Score:       1/26/2025     7:42 PM   PHQ-2 ( 1999 [employer])   Q1: Little interest or pleasure in doing things 0   Q2: Feeling down, depressed or hopeless 0   PHQ-2 Score 0    Q1: Little interest or pleasure in doing things Not at all   Q2: Feeling down, depressed or hopeless Not at all   PHQ-2 Score 0       Patient-reported       PROCEDURE:                                                      After identification and confirmation of the specimen, the patient agreed to proceed. Speculum was inserted into the vagina. Cervix was visualized. The specimen was drawn up into the insemination catheter. The catheter was advanced into the uterine cavity and the specimen was instilled.    POST  PROCEDURE:                                                      The patient did not experience cramping afterwards.  She rested on her back for 10 minutes. She  tolerated the procedure well. There were no complications. Patient was discharged in stable condition.    If she has menses, she will call for a refill of Femara.      Michelle Badillo PA-C

## 2025-01-29 ENCOUNTER — ALLIED HEALTH/NURSE VISIT (OUTPATIENT)
Dept: OBGYN | Facility: CLINIC | Age: 35
End: 2025-01-29
Payer: COMMERCIAL

## 2025-01-29 ENCOUNTER — OFFICE VISIT (OUTPATIENT)
Dept: OBGYN | Facility: CLINIC | Age: 35
End: 2025-01-29
Payer: COMMERCIAL

## 2025-01-29 VITALS — WEIGHT: 142 LBS | DIASTOLIC BLOOD PRESSURE: 70 MMHG | BODY MASS INDEX: 25.15 KG/M2 | SYSTOLIC BLOOD PRESSURE: 100 MMHG

## 2025-01-29 DIAGNOSIS — Z31.81 FEMALE INFERTILITY ASSOCIATED WITH MALE FACTORS: ICD-10-CM

## 2025-01-29 DIAGNOSIS — N97.0 INFERTILITY, ANOVULATION: ICD-10-CM

## 2025-01-29 DIAGNOSIS — N97.8 FEMALE INFERTILITY ASSOCIATED WITH MALE FACTORS: ICD-10-CM

## 2025-01-29 DIAGNOSIS — Z31.9 PROCREATIVE MANAGEMENT: Primary | ICD-10-CM

## 2025-01-29 DIAGNOSIS — E28.2 PCOS (POLYCYSTIC OVARIAN SYNDROME): ICD-10-CM

## 2025-01-29 PROCEDURE — 58322 ARTIFICIAL INSEMINATION: CPT

## 2025-01-29 PROCEDURE — 58323 SPERM WASHING: CPT | Mod: 51

## 2025-01-29 NOTE — PROGRESS NOTES
INDICATIONS:                                                      Is a pregnancy test required: No.  Was a consent obtained?  Yes    Melanie Dugan is here for AIH. She is on cycle day ***. She is taking {[facility] Insem Med:960054} this cycle. Insemination is timed by {[facility] Trigger:537998}. This is insemination number ***.  {[facility] SPERM:473537}    Today's PHQ-2 Score:       1/26/2025     7:42 PM   PHQ-2 ( 1999 [employer])   Q1: Little interest or pleasure in doing things 0   Q2: Feeling down, depressed or hopeless 0   PHQ-2 Score 0    Q1: Little interest or pleasure in doing things Not at all   Q2: Feeling down, depressed or hopeless Not at all   PHQ-2 Score 0       Patient-reported       PROCEDURE:                                                      After identification and confirmation of the specimen, the patient agreed to proceed. Speculum was inserted into the vagina. Cervix was visualized. The specimen was drawn up into the insemination catheter. The catheter {WAS/WAS NOT:9033} advanced into the uterine cavity and the specimen was instilled.    POST  PROCEDURE:                                                      The patient {DID:217265} experience cramping afterwards.  She rested on her back for 10 minutes. She  {PLC Tolerance:108068}. There were no complications. Patient was discharged in stable condition.    {[facility] INSEM INSTR:252358}      [facility] NURSE EDUCATION

## 2025-02-12 ENCOUNTER — VIRTUAL VISIT (OUTPATIENT)
Dept: OBGYN | Facility: CLINIC | Age: 35
End: 2025-02-12
Payer: COMMERCIAL

## 2025-02-12 DIAGNOSIS — Z31.81 FEMALE INFERTILITY ASSOCIATED WITH MALE FACTORS: ICD-10-CM

## 2025-02-12 DIAGNOSIS — N97.8 FEMALE INFERTILITY ASSOCIATED WITH MALE FACTORS: ICD-10-CM

## 2025-02-12 DIAGNOSIS — E28.2 PCOS (POLYCYSTIC OVARIAN SYNDROME): ICD-10-CM

## 2025-02-12 DIAGNOSIS — N97.0 INFERTILITY, ANOVULATION: Primary | ICD-10-CM

## 2025-02-12 RX ORDER — LETROZOLE 2.5 MG/1
2.5 TABLET, FILM COATED ORAL DAILY
Qty: 5 TABLET | Refills: 0 | Status: SHIPPED | OUTPATIENT
Start: 2025-02-12 | End: 2025-02-17
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
## 2025-02-13 NOTE — TELEPHONE ENCOUNTER
"Requested Prescriptions   Pending Prescriptions Disp Refills     spironolactone (ALDACTONE) 100 MG tablet 30 tablet 0     Si tab po daily       Diuretics (Including Combos) Protocol Failed - 3/10/2023  2:32 PM        Failed - Medication is active on med list        Failed - Normal serum creatinine on file in past 12 months     Recent Labs   Lab Test 21  1202   CR 0.76              Failed - Normal serum potassium on file in past 12 months     Recent Labs   Lab Test 21  1202   POTASSIUM 3.4                    Failed - Normal serum sodium on file in past 12 months     Recent Labs   Lab Test 21  1202                 Passed - Blood pressure under 140/90 in past 12 months     BP Readings from Last 3 Encounters:   23 108/64   22 111/73   22 100/68                 Passed - Recent (12 mo) or future (30 days) visit within the authorizing provider's specialty     Patient has had an office visit with the authorizing provider or a provider within the authorizing providers department within the previous 12 mos or has a future within next 30 days. See \"Patient Info\" tab in inbasket, or \"Choose Columns\" in Meds & Orders section of the refill encounter.              Passed - Patient is age 18 or older        Passed - No active pregancy on record        Passed - No positive pregnancy test in past 12 months           Last Written Prescription Date:  22  Last Fill Quantity: 30,  # refills: 0   Last office visit: 23 with prescribing provider:  Selam Krishnamurthy   Future Office Visit:  none    Not on active med list   Refill denied  Pt to call clinic to discuss  Hazel Fuentes RN on 3/10/2023 at 2:44 PM          " show

## 2025-05-14 ENCOUNTER — TELEPHONE (OUTPATIENT)
Dept: OBGYN | Facility: CLINIC | Age: 35
End: 2025-05-14
Payer: COMMERCIAL

## 2025-05-14 DIAGNOSIS — N97.8 FEMALE INFERTILITY ASSOCIATED WITH MALE FACTORS: ICD-10-CM

## 2025-05-14 DIAGNOSIS — N91.4 SECONDARY OLIGOMENORRHEA: ICD-10-CM

## 2025-05-14 DIAGNOSIS — Z31.81 FEMALE INFERTILITY ASSOCIATED WITH MALE FACTORS: ICD-10-CM

## 2025-05-14 NOTE — TELEPHONE ENCOUNTER
Dr Hans, We are folowing up with Melanie Dugan's Letyolanda. Can you verify the amount for the pharmacy to dispense? Directions are to take 2 (5mg) tablets once a day for 5 days but only sent in for 5 tablets to dispense. Could you verify if we need to dispense 10 tablets?     -[facility]

## 2025-05-15 RX ORDER — LETROZOLE 2.5 MG/1
5 TABLET, FILM COATED ORAL DAILY
Qty: 10 TABLET | Refills: 0 | Status: SHIPPED | OUTPATIENT
Start: 2025-05-15

## 2025-05-15 NOTE — TELEPHONE ENCOUNTER
"Dr. Yo response,  \"Take home urine pregnancy test. If negative proceed with provera withdrawal bleed course (10mg by mouth daily for 10 days).   When starts first day of bright red bleeding (typically within 1 wk after has completed the provera bleeding will start), this counts as cycle day 1.   Take femara 5 mg by mouth days 5-9 after taking another home urine pregnancy test to ensure it is negative.   Follicle study day 14.     These Rx's sent to [place] [facility] on file, LURDES Yo, DO       Rx updated to refect 5mg dosing  Requested Prescriptions   Pending Prescriptions Disp Refills    letrozole (FEMARA) 2.5 MG tablet 10 tablet 0     Sig: Take 2 tablets (5 mg) by mouth daily. Cycle days 5-9       There is no refill protocol information for this order          Kathy Campbell RN on 5/15/2025 at 2:58 PM   [facility]      "

## 2025-05-15 NOTE — TELEPHONE ENCOUNTER
The pharmacy is requesting for the provider to update the prescription for it to be for 10 tablets instead of 5 tablets. Please advise.